# Patient Record
Sex: FEMALE | Race: WHITE | Employment: OTHER | URBAN - METROPOLITAN AREA
[De-identification: names, ages, dates, MRNs, and addresses within clinical notes are randomized per-mention and may not be internally consistent; named-entity substitution may affect disease eponyms.]

---

## 2017-02-23 ENCOUNTER — HOSPITAL PATHOLOGY (OUTPATIENT)
Dept: OTHER | Facility: CLINIC | Age: 77
End: 2017-02-23

## 2017-02-24 LAB — COPATH REPORT: NORMAL

## 2017-11-02 ENCOUNTER — TRANSFERRED RECORDS (OUTPATIENT)
Dept: HEALTH INFORMATION MANAGEMENT | Facility: CLINIC | Age: 77
End: 2017-11-02

## 2017-12-15 RX ORDER — BUTALBITAL/ASPIRIN/CAFFEINE 50-325-40
1 CAPSULE ORAL 2 TIMES DAILY PRN
Status: ON HOLD | COMMUNITY
End: 2019-11-13

## 2017-12-15 RX ORDER — ALBUTEROL SULFATE 90 UG/1
1-2 AEROSOL, METERED RESPIRATORY (INHALATION) EVERY 4 HOURS PRN
COMMUNITY

## 2017-12-15 RX ORDER — TRIAMTERENE/HYDROCHLOROTHIAZID 37.5-25 MG
1 TABLET ORAL EVERY MORNING
COMMUNITY

## 2017-12-15 RX ORDER — MULTIPLE VITAMINS W/ MINERALS TAB 9MG-400MCG
1 TAB ORAL DAILY
COMMUNITY

## 2017-12-17 ENCOUNTER — ANESTHESIA EVENT (OUTPATIENT)
Dept: SURGERY | Facility: CLINIC | Age: 77
DRG: 467 | End: 2017-12-17
Payer: MEDICARE

## 2017-12-18 ENCOUNTER — ANESTHESIA (OUTPATIENT)
Dept: SURGERY | Facility: CLINIC | Age: 77
DRG: 467 | End: 2017-12-18
Payer: MEDICARE

## 2017-12-18 ENCOUNTER — HOSPITAL ENCOUNTER (INPATIENT)
Facility: CLINIC | Age: 77
LOS: 1 days | Discharge: HOME | DRG: 467 | End: 2017-12-19
Attending: ORTHOPAEDIC SURGERY | Admitting: ORTHOPAEDIC SURGERY
Payer: MEDICARE

## 2017-12-18 DIAGNOSIS — Z96.652 HX OF TOTAL KNEE ARTHROPLASTY, LEFT: Primary | ICD-10-CM

## 2017-12-18 PROCEDURE — 40000170 ZZH STATISTIC PRE-PROCEDURE ASSESSMENT II: Performed by: ORTHOPAEDIC SURGERY

## 2017-12-18 PROCEDURE — 36000069 ZZH SURGERY LEVEL 5 EA 15 ADDTL MIN: Performed by: ORTHOPAEDIC SURGERY

## 2017-12-18 PROCEDURE — 25000128 H RX IP 250 OP 636: Performed by: ANESTHESIOLOGY

## 2017-12-18 PROCEDURE — A9270 NON-COVERED ITEM OR SERVICE: HCPCS | Mod: GY | Performed by: ORTHOPAEDIC SURGERY

## 2017-12-18 PROCEDURE — 0SPW0JZ REMOVAL OF SYNTHETIC SUBSTITUTE FROM LEFT KNEE JOINT, TIBIAL SURFACE, OPEN APPROACH: ICD-10-PCS | Performed by: ORTHOPAEDIC SURGERY

## 2017-12-18 PROCEDURE — 25000128 H RX IP 250 OP 636: Performed by: NURSE ANESTHETIST, CERTIFIED REGISTERED

## 2017-12-18 PROCEDURE — 25000125 ZZHC RX 250: Performed by: ORTHOPAEDIC SURGERY

## 2017-12-18 PROCEDURE — 36000067 ZZH SURGERY LEVEL 5 1ST 30 MIN: Performed by: ORTHOPAEDIC SURGERY

## 2017-12-18 PROCEDURE — 37000008 ZZH ANESTHESIA TECHNICAL FEE, 1ST 30 MIN: Performed by: ORTHOPAEDIC SURGERY

## 2017-12-18 PROCEDURE — 25000128 H RX IP 250 OP 636: Performed by: ORTHOPAEDIC SURGERY

## 2017-12-18 PROCEDURE — 25000128 H RX IP 250 OP 636: Performed by: PHYSICIAN ASSISTANT

## 2017-12-18 PROCEDURE — 25000566 ZZH SEVOFLURANE, EA 15 MIN: Performed by: ORTHOPAEDIC SURGERY

## 2017-12-18 PROCEDURE — C1776 JOINT DEVICE (IMPLANTABLE): HCPCS | Performed by: ORTHOPAEDIC SURGERY

## 2017-12-18 PROCEDURE — 71000012 ZZH RECOVERY PHASE 1 LEVEL 1 FIRST HR: Performed by: ORTHOPAEDIC SURGERY

## 2017-12-18 PROCEDURE — 25000125 ZZHC RX 250: Performed by: NURSE ANESTHETIST, CERTIFIED REGISTERED

## 2017-12-18 PROCEDURE — 27210995 ZZH RX 272: Performed by: ORTHOPAEDIC SURGERY

## 2017-12-18 PROCEDURE — 25000132 ZZH RX MED GY IP 250 OP 250 PS 637: Mod: GY | Performed by: ORTHOPAEDIC SURGERY

## 2017-12-18 PROCEDURE — 12000007 ZZH R&B INTERMEDIATE

## 2017-12-18 PROCEDURE — 25800025 ZZH RX 258: Performed by: ORTHOPAEDIC SURGERY

## 2017-12-18 PROCEDURE — 37000009 ZZH ANESTHESIA TECHNICAL FEE, EACH ADDTL 15 MIN: Performed by: ORTHOPAEDIC SURGERY

## 2017-12-18 PROCEDURE — 0SRW0JZ REPLACEMENT OF LEFT KNEE JOINT, TIBIAL SURFACE WITH SYNTHETIC SUBSTITUTE, OPEN APPROACH: ICD-10-PCS | Performed by: ORTHOPAEDIC SURGERY

## 2017-12-18 PROCEDURE — 25000125 ZZHC RX 250: Performed by: ANESTHESIOLOGY

## 2017-12-18 PROCEDURE — 27210794 ZZH OR GENERAL SUPPLY STERILE: Performed by: ORTHOPAEDIC SURGERY

## 2017-12-18 DEVICE — IMPLANTABLE DEVICE: Type: IMPLANTABLE DEVICE | Site: KNEE | Status: FUNCTIONAL

## 2017-12-18 RX ORDER — DEXTROSE MONOHYDRATE, SODIUM CHLORIDE, AND POTASSIUM CHLORIDE 50; 1.49; 4.5 G/1000ML; G/1000ML; G/1000ML
INJECTION, SOLUTION INTRAVENOUS CONTINUOUS
Status: DISCONTINUED | OUTPATIENT
Start: 2017-12-18 | End: 2017-12-19 | Stop reason: HOSPADM

## 2017-12-18 RX ORDER — TRAMADOL HYDROCHLORIDE 50 MG/1
50 TABLET ORAL EVERY 4 HOURS PRN
Status: DISCONTINUED | OUTPATIENT
Start: 2017-12-18 | End: 2017-12-18

## 2017-12-18 RX ORDER — ONDANSETRON 2 MG/ML
4 INJECTION INTRAMUSCULAR; INTRAVENOUS EVERY 30 MIN PRN
Status: DISCONTINUED | OUTPATIENT
Start: 2017-12-18 | End: 2017-12-18 | Stop reason: HOSPADM

## 2017-12-18 RX ORDER — PROPOFOL 10 MG/ML
INJECTION, EMULSION INTRAVENOUS PRN
Status: DISCONTINUED | OUTPATIENT
Start: 2017-12-18 | End: 2017-12-18

## 2017-12-18 RX ORDER — CELECOXIB 50 MG/1
50 CAPSULE ORAL 2 TIMES DAILY
Status: DISCONTINUED | OUTPATIENT
Start: 2017-12-18 | End: 2017-12-19 | Stop reason: HOSPADM

## 2017-12-18 RX ORDER — FENTANYL CITRATE 50 UG/ML
INJECTION, SOLUTION INTRAMUSCULAR; INTRAVENOUS PRN
Status: DISCONTINUED | OUTPATIENT
Start: 2017-12-18 | End: 2017-12-18

## 2017-12-18 RX ORDER — FENTANYL CITRATE 50 UG/ML
25-50 INJECTION, SOLUTION INTRAMUSCULAR; INTRAVENOUS
Status: DISCONTINUED | OUTPATIENT
Start: 2017-12-18 | End: 2017-12-18 | Stop reason: HOSPADM

## 2017-12-18 RX ORDER — ONDANSETRON 2 MG/ML
4 INJECTION INTRAMUSCULAR; INTRAVENOUS EVERY 6 HOURS PRN
Status: DISCONTINUED | OUTPATIENT
Start: 2017-12-18 | End: 2017-12-19 | Stop reason: HOSPADM

## 2017-12-18 RX ORDER — SODIUM CHLORIDE, SODIUM LACTATE, POTASSIUM CHLORIDE, CALCIUM CHLORIDE 600; 310; 30; 20 MG/100ML; MG/100ML; MG/100ML; MG/100ML
INJECTION, SOLUTION INTRAVENOUS CONTINUOUS
Status: DISCONTINUED | OUTPATIENT
Start: 2017-12-18 | End: 2017-12-18 | Stop reason: HOSPADM

## 2017-12-18 RX ORDER — ONDANSETRON 4 MG/1
4 TABLET, ORALLY DISINTEGRATING ORAL EVERY 30 MIN PRN
Status: DISCONTINUED | OUTPATIENT
Start: 2017-12-18 | End: 2017-12-18 | Stop reason: HOSPADM

## 2017-12-18 RX ORDER — EPHEDRINE SULFATE 50 MG/ML
INJECTION, SOLUTION INTRAMUSCULAR; INTRAVENOUS; SUBCUTANEOUS PRN
Status: DISCONTINUED | OUTPATIENT
Start: 2017-12-18 | End: 2017-12-18

## 2017-12-18 RX ORDER — LOSARTAN POTASSIUM 100 MG/1
100 TABLET ORAL AT BEDTIME
Status: DISCONTINUED | OUTPATIENT
Start: 2017-12-18 | End: 2017-12-19 | Stop reason: HOSPADM

## 2017-12-18 RX ORDER — BUPIVACAINE HYDROCHLORIDE AND EPINEPHRINE 5; 5 MG/ML; UG/ML
INJECTION, SOLUTION PERINEURAL PRN
Status: DISCONTINUED | OUTPATIENT
Start: 2017-12-18 | End: 2017-12-18 | Stop reason: HOSPADM

## 2017-12-18 RX ORDER — HYDROMORPHONE HYDROCHLORIDE 1 MG/ML
.3-.5 INJECTION, SOLUTION INTRAMUSCULAR; INTRAVENOUS; SUBCUTANEOUS
Status: DISCONTINUED | OUTPATIENT
Start: 2017-12-18 | End: 2017-12-19 | Stop reason: HOSPADM

## 2017-12-18 RX ORDER — FENTANYL CITRATE 0.05 MG/ML
25-50 INJECTION, SOLUTION INTRAMUSCULAR; INTRAVENOUS
Status: DISCONTINUED | OUTPATIENT
Start: 2017-12-18 | End: 2017-12-18 | Stop reason: HOSPADM

## 2017-12-18 RX ORDER — BUPIVACAINE HYDROCHLORIDE 7.5 MG/ML
INJECTION, SOLUTION INTRASPINAL PRN
Status: DISCONTINUED | OUTPATIENT
Start: 2017-12-18 | End: 2017-12-18

## 2017-12-18 RX ORDER — HYDROCODONE BITARTRATE AND ACETAMINOPHEN 5; 325 MG/1; MG/1
1-2 TABLET ORAL EVERY 4 HOURS PRN
Status: DISCONTINUED | OUTPATIENT
Start: 2017-12-18 | End: 2017-12-19 | Stop reason: HOSPADM

## 2017-12-18 RX ORDER — MAGNESIUM HYDROXIDE 1200 MG/15ML
LIQUID ORAL PRN
Status: DISCONTINUED | OUTPATIENT
Start: 2017-12-18 | End: 2017-12-18 | Stop reason: HOSPADM

## 2017-12-18 RX ORDER — LIDOCAINE 40 MG/G
CREAM TOPICAL
Status: DISCONTINUED | OUTPATIENT
Start: 2017-12-18 | End: 2017-12-19 | Stop reason: HOSPADM

## 2017-12-18 RX ORDER — TRIAMTERENE/HYDROCHLOROTHIAZID 37.5-25 MG
1 TABLET ORAL EVERY MORNING
Status: DISCONTINUED | OUTPATIENT
Start: 2017-12-19 | End: 2017-12-19 | Stop reason: HOSPADM

## 2017-12-18 RX ORDER — CARVEDILOL 6.25 MG/1
12.5 TABLET ORAL 2 TIMES DAILY
Status: DISCONTINUED | OUTPATIENT
Start: 2017-12-18 | End: 2017-12-19 | Stop reason: HOSPADM

## 2017-12-18 RX ORDER — CELECOXIB 50 MG/1
50 CAPSULE ORAL 2 TIMES DAILY
Status: DISCONTINUED | OUTPATIENT
Start: 2017-12-18 | End: 2017-12-18

## 2017-12-18 RX ORDER — AMOXICILLIN 250 MG
1-2 CAPSULE ORAL 2 TIMES DAILY
Status: DISCONTINUED | OUTPATIENT
Start: 2017-12-18 | End: 2017-12-19 | Stop reason: HOSPADM

## 2017-12-18 RX ORDER — TRAMADOL HYDROCHLORIDE 50 MG/1
50 TABLET ORAL EVERY 6 HOURS PRN
Status: DISCONTINUED | OUTPATIENT
Start: 2017-12-18 | End: 2017-12-19 | Stop reason: HOSPADM

## 2017-12-18 RX ORDER — NALOXONE HYDROCHLORIDE 0.4 MG/ML
.1-.4 INJECTION, SOLUTION INTRAMUSCULAR; INTRAVENOUS; SUBCUTANEOUS
Status: DISCONTINUED | OUTPATIENT
Start: 2017-12-18 | End: 2017-12-19 | Stop reason: HOSPADM

## 2017-12-18 RX ORDER — HYDROMORPHONE HYDROCHLORIDE 1 MG/ML
.3-.5 INJECTION, SOLUTION INTRAMUSCULAR; INTRAVENOUS; SUBCUTANEOUS EVERY 5 MIN PRN
Status: DISCONTINUED | OUTPATIENT
Start: 2017-12-18 | End: 2017-12-18

## 2017-12-18 RX ORDER — ONDANSETRON 4 MG/1
4 TABLET, ORALLY DISINTEGRATING ORAL EVERY 6 HOURS PRN
Status: DISCONTINUED | OUTPATIENT
Start: 2017-12-18 | End: 2017-12-19 | Stop reason: HOSPADM

## 2017-12-18 RX ORDER — CEFAZOLIN SODIUM 2 G/100ML
2 INJECTION, SOLUTION INTRAVENOUS
Status: COMPLETED | OUTPATIENT
Start: 2017-12-18 | End: 2017-12-18

## 2017-12-18 RX ORDER — NALOXONE HYDROCHLORIDE 0.4 MG/ML
.1-.4 INJECTION, SOLUTION INTRAMUSCULAR; INTRAVENOUS; SUBCUTANEOUS
Status: DISCONTINUED | OUTPATIENT
Start: 2017-12-18 | End: 2017-12-18

## 2017-12-18 RX ORDER — ACETAMINOPHEN 325 MG/1
325 TABLET ORAL EVERY 4 HOURS PRN
Status: DISCONTINUED | OUTPATIENT
Start: 2017-12-18 | End: 2017-12-19 | Stop reason: HOSPADM

## 2017-12-18 RX ORDER — PROPOFOL 10 MG/ML
INJECTION, EMULSION INTRAVENOUS CONTINUOUS PRN
Status: DISCONTINUED | OUTPATIENT
Start: 2017-12-18 | End: 2017-12-18

## 2017-12-18 RX ORDER — DEXAMETHASONE SODIUM PHOSPHATE 4 MG/ML
INJECTION, SOLUTION INTRA-ARTICULAR; INTRALESIONAL; INTRAMUSCULAR; INTRAVENOUS; SOFT TISSUE PRN
Status: DISCONTINUED | OUTPATIENT
Start: 2017-12-18 | End: 2017-12-18

## 2017-12-18 RX ORDER — LIDOCAINE HYDROCHLORIDE 20 MG/ML
INJECTION, SOLUTION INFILTRATION; PERINEURAL PRN
Status: DISCONTINUED | OUTPATIENT
Start: 2017-12-18 | End: 2017-12-18

## 2017-12-18 RX ADMIN — Medication 10 MG: at 16:34

## 2017-12-18 RX ADMIN — DEXMEDETOMIDINE HYDROCHLORIDE 8 MCG: 100 INJECTION, SOLUTION INTRAVENOUS at 15:48

## 2017-12-18 RX ADMIN — HYDROCODONE BITARTRATE AND ACETAMINOPHEN 1 TABLET: 5; 325 TABLET ORAL at 20:46

## 2017-12-18 RX ADMIN — LIDOCAINE HYDROCHLORIDE 1 ML: 10 INJECTION, SOLUTION EPIDURAL; INFILTRATION; INTRACAUDAL; PERINEURAL at 13:27

## 2017-12-18 RX ADMIN — SUCCINYLCHOLINE CHLORIDE 20 MG: 20 INJECTION, SOLUTION INTRAMUSCULAR; INTRAVENOUS at 16:03

## 2017-12-18 RX ADMIN — LOSARTAN POTASSIUM 100 MG: 100 TABLET, FILM COATED ORAL at 22:38

## 2017-12-18 RX ADMIN — CARVEDILOL 12.5 MG: 6.25 TABLET, FILM COATED ORAL at 20:47

## 2017-12-18 RX ADMIN — SODIUM CHLORIDE, POTASSIUM CHLORIDE, SODIUM LACTATE AND CALCIUM CHLORIDE: 600; 310; 30; 20 INJECTION, SOLUTION INTRAVENOUS at 16:36

## 2017-12-18 RX ADMIN — EPINEPHRINE 20 ML GIVEN: 1 INJECTION INTRAMUSCULAR; INTRAVENOUS; SUBCUTANEOUS at 13:55

## 2017-12-18 RX ADMIN — BUPIVACAINE HYDROCHLORIDE IN DEXTROSE 9 MG: 7.5 INJECTION, SOLUTION SUBARACHNOID at 15:52

## 2017-12-18 RX ADMIN — PROPOFOL 150 MG: 10 INJECTION, EMULSION INTRAVENOUS at 16:00

## 2017-12-18 RX ADMIN — FENTANYL CITRATE 50 MCG: 50 INJECTION, SOLUTION INTRAMUSCULAR; INTRAVENOUS at 16:09

## 2017-12-18 RX ADMIN — DEXMEDETOMIDINE HYDROCHLORIDE 12 MCG: 100 INJECTION, SOLUTION INTRAVENOUS at 16:12

## 2017-12-18 RX ADMIN — CEFAZOLIN SODIUM 1 G: 1 SOLUTION INTRAVENOUS at 22:40

## 2017-12-18 RX ADMIN — FENTANYL CITRATE 50 MCG: 50 INJECTION, SOLUTION INTRAMUSCULAR; INTRAVENOUS at 16:12

## 2017-12-18 RX ADMIN — LIDOCAINE HYDROCHLORIDE 40 MG: 20 INJECTION, SOLUTION INFILTRATION; PERINEURAL at 15:56

## 2017-12-18 RX ADMIN — MIDAZOLAM HYDROCHLORIDE 1 MG: 1 INJECTION, SOLUTION INTRAMUSCULAR; INTRAVENOUS at 14:02

## 2017-12-18 RX ADMIN — FENTANYL CITRATE 50 MCG: 50 INJECTION, SOLUTION INTRAMUSCULAR; INTRAVENOUS at 14:02

## 2017-12-18 RX ADMIN — SODIUM CHLORIDE, POTASSIUM CHLORIDE, SODIUM LACTATE AND CALCIUM CHLORIDE: 600; 310; 30; 20 INJECTION, SOLUTION INTRAVENOUS at 13:27

## 2017-12-18 RX ADMIN — Medication 10 MG: at 16:15

## 2017-12-18 RX ADMIN — POTASSIUM CHLORIDE, DEXTROSE MONOHYDRATE AND SODIUM CHLORIDE: 150; 5; 450 INJECTION, SOLUTION INTRAVENOUS at 19:26

## 2017-12-18 RX ADMIN — DEXAMETHASONE SODIUM PHOSPHATE 4 MG: 4 INJECTION, SOLUTION INTRA-ARTICULAR; INTRALESIONAL; INTRAMUSCULAR; INTRAVENOUS; SOFT TISSUE at 16:16

## 2017-12-18 RX ADMIN — PROPOFOL 100 MCG/KG/MIN: 10 INJECTION, EMULSION INTRAVENOUS at 16:04

## 2017-12-18 RX ADMIN — HYDROCODONE BITARTRATE AND ACETAMINOPHEN 1 TABLET: 5; 325 TABLET ORAL at 19:49

## 2017-12-18 RX ADMIN — PROPOFOL 50 MG: 10 INJECTION, EMULSION INTRAVENOUS at 16:12

## 2017-12-18 RX ADMIN — CEFAZOLIN SODIUM 2 G: 2 INJECTION, SOLUTION INTRAVENOUS at 15:55

## 2017-12-18 ASSESSMENT — ENCOUNTER SYMPTOMS: DYSRHYTHMIAS: 1

## 2017-12-18 NOTE — IP AVS SNAPSHOT
MRN:[id number]                      After Visit Summary   12/18/2017    Suzi Amaya    MRN: [id number]           Thank you!     Thank you for choosing [facility] for your care. Our goal is always to provide you with excellent care. Hearing back from our patients is one way we can continue to improve our services. Please take a few minutes to complete the written survey that you may receive in the mail after you visit with us. Thank you!        Patient Information     Date Of Birth          1940        Designated Caregiver       Most Recent Value    Caregiver    Will someone help with your care after discharge? yes    Name of designated caregiver Jose Alejandro-    Phone number of caregiver [phone]    Caregiver address [address] [place] [address]      About your hospital stay     You were admitted on:  December 18, 2017 You last received care in the:  [facility] [address] [facility]    You were discharged on:  December 19, 2017        Reason for your hospital stay       S/P Poly Exchange, Left knee                  Who to Call     For medical emergencies, please call 911.  For non-urgent questions about your medical care, please call your primary care provider or clinic, None  For questions related to your surgery, please call your surgery clinic        Attending Provider     Provider Blu Crump MD Orthopedics       Primary Care Provider Fax #    Physician No Ref-Primary [phone]      After Care Instructions     Activity       Your activity upon discharge: activity as tolerated            Diet       Follow this diet upon discharge: Orders Placed This Encounter      Advance Diet as Tolerated: Regular Diet Adult            Wound care and dressings       Instructions to care for your wound at home: daily dressing changes.                  Follow-up Appointments     Follow-up and recommended labs and tests        Follow up with Dr Mitchell in two weeks. To  "make an appointment call Susan at [phone]                  Your next 10 appointments already scheduled     Dec 21, 2017  2:40 PM CST   (Arrive by 2:25 PM)   LUIS Extremity with Jesusita Bethea PT   [facility] ([facility]  )    [address] [facility] [address] [phone]              Pending Results     No orders found for last 3 day(s).            Statement of Approval     Ordered          17 0911  I have reviewed and agree with all the recommendations and orders detailed in this document.  EFFECTIVE NOW     Approved and electronically signed by:  Yadi Cruz PA-C             Admission Information     Date & Time Provider Department Dept. Phone    2017 Blu Mitchell MD [facility] [address] [facility] [phone]      Your Vitals Were     Blood Pressure Pulse Temperature Respirations Height Weight    104/63 (BP Location: Left arm) 62 97.7  F (36.5  C) (Oral) 16 1.575 m (5' 2\") 66.5 kg (146 lb 11.2 oz)    Pulse Oximetry BMI (Body Mass Index)                95% 26.83 kg/m2          MyChart Information     [facility] lets you send messages to your doctor, view your test results, renew your prescriptions, schedule appointments and more. To sign up, go to [email or web address] . Click on \"Log in\" on the left side of the screen, which will take you to the Welcome page. Then click on \"Sign up Now\" on the right side of the page.     You will be asked to enter the access code listed below, as well as some personal information. Please follow the directions to create your username and password.     Your access code is: [email or web address]  Expires: 3/19/2018  9:53 AM     Your access code will  in 90 days. If you need help or a new code, please call your [facility] clinic or [phone].        Care EveryWhere ID     This is your Care EveryWhere ID. This could be used by other organizations to access your [facility] " medical records  [id number]        Equal Access to Services     RACHELLE BELLAMY : Hadii aad ku hadguillerminadanni Mares, waivetda kalyan, qaconsuelota prakashmamik tabor, rogerio serrano. So [facility] [phone].    ATENCIÓN: Si habla español, tiene a king disposición servicios gratuitos de asistencia lingüística. Llame al [phone].    We comply with applicable federal civil rights laws and [place] laws. We do not discriminate on the basis of race, color, national origin, age, disability, sex, sexual orientation, or gender identity.               Review of your medicines      START taking        Dose / Directions    acetaminophen 325 MG tablet   Commonly known as:  TYLENOL   Notes to Patient:  Do not take tylenol while on Norco        Dose:  325 mg   Take 1 tablet (325 mg) by mouth every 4 hours as needed for mild pain or fever   Quantity:  100 tablet   Refills:  0       HYDROcodone-acetaminophen 5-325 MG per tablet   Commonly known as:  NORCO        Dose:  1-2 tablet   Take 1-2 tablets by mouth every 4 hours as needed for moderate to severe pain   Quantity:  40 tablet   Refills:  0       ondansetron 4 MG ODT tab   Commonly known as:  ZOFRAN-ODT        Dose:  4 mg   Take 1 tablet (4 mg) by mouth every 6 hours as needed for nausea or vomiting   Quantity:  20 tablet   Refills:  0       senna-docusate 8.6-50 MG per tablet   Commonly known as:  SENOKOT-S;PERICOLACE   Notes to Patient:  Continue while on narcotics; stop taking if having loose stools; you can take 1 tablet twice a day once having a bowel movement        Dose:  1-2 tablet   Take 1-2 tablets by mouth 2 times daily   Quantity:  50 tablet   Refills:  0       traMADol 50 MG tablet   Commonly known as:  ULTRAM        Dose:  50 mg   Take 1 tablet (50 mg) by mouth every 6 hours as needed for moderate pain   Quantity:  20 tablet   Refills:  0         CONTINUE these medicines which have NOT CHANGED        Dose / Directions    albuterol 108 (90 BASE)  MCG/ACT Inhaler   Commonly known as:  PROAIR HFA/PROVENTIL HFA/VENTOLIN HFA        Dose:  1-2 puff   Inhale 1-2 puffs into the lungs every 4 hours as needed for shortness of breath / dyspnea or wheezing   Refills:  0       ALLERGY INJECTIONS        Dose:  1 Dose   Inject 1 Dose as directed every 30 days Given at [facility]   Refills:  0       B-12 PO        Dose:  1 tablet   Take 1 tablet by mouth daily   Refills:  0       butalbital-aspirin-caffeine -40 MG per capsule   Commonly known as:  FIORINAL        Dose:  1 capsule   Take 1 capsule by mouth 2 times daily as needed for headaches   Refills:  0       CARVEDILOL PO        Dose:  12.5 mg   Take 12.5 mg by mouth 2 times daily   Refills:  0       CELEBREX PO        Dose:  100 mg   Take 100 mg by mouth daily as needed for moderate pain   Refills:  0       ELIQUIS PO   Notes to Patient:  Start tomorrow night (12/20) night per surgeon's verbal recommendation        Dose:  5 mg   Take 5 mg by mouth 2 times daily   Refills:  0       LOSARTAN POTASSIUM PO        Dose:  100 mg   Take 100 mg by mouth At Bedtime   Refills:  0       multivitamin, therapeutic with minerals Tabs tablet        Dose:  1 tablet   Take 1 tablet by mouth daily   Refills:  0       OMEPRAZOLE PO        Dose:  40 mg   Take 40 mg by mouth daily (with dinner)   Refills:  0       triamterene-hydrochlorothiazide 37.5-25 MG per tablet   Commonly known as:  MAXZIDE-25        Dose:  1 tablet   Take 1 tablet by mouth every morning   Refills:  0       VITAMIN D (CHOLECALCIFEROL) PO        Dose:  1 tablet   Take 1 tablet by mouth daily (with dinner)   Refills:  0            Where to get your medicines      These medications were sent to [facility] - [address]  [address], [place] [address]     Phone:  [phone]     acetaminophen 325 MG tablet    ondansetron 4 MG ODT tab    senna-docusate 8.6-50 MG per tablet         Some of these will need a  paper prescription and others can be bought over the counter. Ask your nurse if you have questions.     Bring a paper prescription for each of these medications     HYDROcodone-acetaminophen 5-325 MG per tablet    traMADol 50 MG tablet                Protect others around you: Learn how to safely use, store and throw away your medicines at www.disposemymeds.org.             Medication List: This is a list of all your medications and when to take them. Check marks below indicate your daily home schedule. Keep this list as a reference.      Medications           Morning Afternoon Evening Bedtime As Needed    acetaminophen 325 MG tablet   Commonly known as:  TYLENOL   Take 1 tablet (325 mg) by mouth every 4 hours as needed for mild pain or fever   Notes to Patient:  Do not take tylenol while on Norco                                   albuterol 108 (90 BASE) MCG/ACT Inhaler   Commonly known as:  PROAIR HFA/PROVENTIL HFA/VENTOLIN HFA   Inhale 1-2 puffs into the lungs every 4 hours as needed for shortness of breath / dyspnea or wheezing   Next Dose Due:  Resume home meds                                ALLERGY INJECTIONS   Inject 1 Dose as directed every 30 days Given at [facility]   Next Dose Due:  Resume home meds                                B-12 PO   Take 1 tablet by mouth daily   Next Dose Due:  Resume home meds                                butalbital-aspirin-caffeine -40 MG per capsule   Commonly known as:  FIORINAL   Take 1 capsule by mouth 2 times daily as needed for headaches   Next Dose Due:  Resume home meds                                CARVEDILOL PO   Take 12.5 mg by mouth 2 times daily   Last time this was given:  12.5 mg on 12/19/2017  8:09 AM   Next Dose Due:  Tonight                                       CELEBREX PO   Take 100 mg by mouth daily as needed for moderate pain   Last time this was given:  50 mg on 12/19/2017  8:09 AM   Next Dose Due:  12/20/17                                    ELIQUIS PO   Take 5 mg by mouth 2 times daily   Next Dose Due:  Resume home meds   Notes to Patient:  Start tomorrow night (12/20) night per surgeon's verbal recommendation                                HYDROcodone-acetaminophen 5-325 MG per tablet   Commonly known as:  NORCO   Take 1-2 tablets by mouth every 4 hours as needed for moderate to severe pain   Last time this was given:  2 tablets on 12/19/2017  8:09 AM   Next Dose Due:  12:10pm                                   LOSARTAN POTASSIUM PO   Take 100 mg by mouth At Bedtime   Last time this was given:  100 mg on 12/18/2017 10:38 PM   Next Dose Due:  Tonight                                    multivitamin, therapeutic with minerals Tabs tablet   Take 1 tablet by mouth daily   Next Dose Due:  Resume home meds                                OMEPRAZOLE PO   Take 40 mg by mouth daily (with dinner)   Next Dose Due:  Resume home meds                                ondansetron 4 MG ODT tab   Commonly known as:  ZOFRAN-ODT   Take 1 tablet (4 mg) by mouth every 6 hours as needed for nausea or vomiting                                senna-docusate 8.6-50 MG per tablet   Commonly known as:  SENOKOT-S;PERICOLACE   Take 1-2 tablets by mouth 2 times daily   Last time this was given:  2 tablets on 12/19/2017  8:10 AM   Next Dose Due:  Tonight    Notes to Patient:  Continue while on narcotics; stop taking if having loose stools; you can take 1 tablet twice a day once having a bowel movement                                traMADol 50 MG tablet   Commonly known as:  ULTRAM   Take 1 tablet (50 mg) by mouth every 6 hours as needed for moderate pain   Next Dose Due:  At anytime; try to alternate with Norco.                                   triamterene-hydrochlorothiazide 37.5-25 MG per tablet   Commonly known as:  MAXZIDE-25   Take 1 tablet by mouth every morning   Last time this was given:  1 tablet on 12/19/2017  8:09 AM   Next Dose Due:  12/20/17                                    VITAMIN D (CHOLECALCIFEROL) PO   Take 1 tablet by mouth daily (with dinner)   Next Dose Due:  Resume home meds

## 2017-12-18 NOTE — IP AVS SNAPSHOT
[address] [facility]    [address] [place] [address]    Phone:  [phone]                                       After Visit Summary   12/18/2017    Suzi Amaya    MRN: [id number]           After Visit Summary Signature Page     I have received my discharge instructions, and my questions have been answered. I have discussed any challenges I see with this plan with the nurse or doctor.    ..........................................................................................................................................  Patient/Patient Representative Signature      ..........................................................................................................................................  Patient Representative Print Name and Relationship to Patient    ..................................................               ................................................  Date                                            Time    ..........................................................................................................................................  Reviewed by Signature/Title    ...................................................              ..............................................  Date                                                            Time

## 2017-12-18 NOTE — ANESTHESIA PROCEDURE NOTES
Peripheral nerve/Neuraxial procedure note : intrathecal  Pre-Procedure  Performed by ELAINE NEWSOME  Location: OR      Pre-Anesthestic Checklist: patient identified, IV checked, risks and benefits discussed, informed consent, monitors and equipment checked and pre-op evaluation    Timeout  Correct Patient: Yes   Correct Procedure: Yes   Correct Site: Yes   Correct Laterality: N/A   Correct Position: Yes   Site Marked: N/A   .   Procedure Documentation    .    Procedure:    Intrathecal.  Insertion Site:L3-4  (midline approach)      Patient Prep;povidone-iodine 7.5% surgical scrub.  .  Needle: Nisha tip Spinal Needle (gauge): 25  Spinal/LP Needle Length (inches): 3.5 # of attempts: 1 and # of redirects:  .       Assessment/Narrative  Paresthesias: No.  .  .  clear CSF fluid removed . Comments:  Subarachnoid Block  1.2 ml 0.75% bupivacaine with dextrose

## 2017-12-18 NOTE — PROGRESS NOTES
Admission medication history interview status for the 12/18/2017  admission is complete. See EPIC admission navigator for prior to admission medications     Medication history source reliability:Good    Medication history interview source(s):Patient    Medication history resources (including written lists, pill bottles, clinic record):Patient brought a list with her    Primary pharmacy[facility]    Additional medication history information not noted on PTA med list :None    Time spent in this activity: 45 minutes    Prior to Admission medications    Medication Sig Last Dose Taking? Auth Provider   VITAMIN D, CHOLECALCIFEROL, PO Take 1 tablet by mouth daily (with dinner) 12/17/2017 at PM Yes Reported, Patient   Cyanocobalamin (B-12 PO) Take 1 tablet by mouth daily 12/17/2017 at AM Yes Reported, Patient   albuterol (PROAIR HFA/PROVENTIL HFA/VENTOLIN HFA) 108 (90 BASE) MCG/ACT Inhaler Inhale 1-2 puffs into the lungs every 4 hours as needed for shortness of breath / dyspnea or wheezing More than a month at PRN Yes Reported, Patient   butalbital-aspirin-caffeine (FIORINAL) -40 MG per capsule Take 1 capsule by mouth 2 times daily as needed for headaches  One Month Ago at PRN Yes Reported, Patient   CARVEDILOL PO Take 12.5 mg by mouth 2 times daily 12/18/2017 at 0800 Yes Reported, Patient   Celecoxib (CELEBREX PO) Take 100 mg by mouth daily as needed for moderate pain  More than a Month at PRN Yes Reported, Patient   Apixaban (ELIQUIS PO) Take 5 mg by mouth 2 times daily 12/14/2017 at AM Yes Reported, Patient   LOSARTAN POTASSIUM PO Take 100 mg by mouth At Bedtime 12/17/2017 at HS Yes Reported, Patient   multivitamin, therapeutic with minerals (THERA-VIT-M) TABS tablet Take 1 tablet by mouth daily 12/17/2017 at AM Yes Reported, Patient   OMEPRAZOLE PO Take 40 mg by mouth daily (with dinner)  12/17/2017 at PM Yes Reported, Patient   triamterene-hydrochlorothiazide (MAXZIDE-25) 37.5-25 MG per tablet Take 1 tablet by mouth  every morning 12/18/2017 at 0800 Yes Reported, Patient   ALLERGY INJECTIONS Inject 1 Dose as directed every 30 days Given at [facility] November 2017 Yes Reported, Patient

## 2017-12-18 NOTE — ANESTHESIA PREPROCEDURE EVALUATION
Anesthesia Evaluation     . Pt has had prior anesthetic.     No history of anesthetic complications          ROS/MED HX    ENT/Pulmonary:     (+)Intermittent asthma Treatment: Inhaler prn,  , . .   (-) sleep apnea   Neurologic:       Cardiovascular:     (+) hypertension----. : . CHF . . :. dysrhythmias (paroxysmal) a-fib, Irregular Heartbeat/Palpitations, . Previous cardiac testing Echodate:2016results:EF 50-55%date: results: date: results: date: results:          METS/Exercise Tolerance:     Hematologic:         Musculoskeletal:         GI/Hepatic:     (+) GERD Asymptomatic on medication,       Renal/Genitourinary:         Endo:         Psychiatric:         Infectious Disease:         Malignancy:         Other:                     Physical Exam  Normal systems: cardiovascular and pulmonary    Airway   Mallampati: II  TM distance: >3 FB  Neck ROM: full    Dental   Comment: native    Cardiovascular       Pulmonary                     Anesthesia Plan      History & Physical Review  History and physical reviewed and following examination; no interval change.    ASA Status:  2 .    NPO Status:  > 8 hours    Plan for Spinal and Periph. Nerve Block for postop pain   PONV prophylaxis:  Ondansetron (or other 5HT-3)       Postoperative Care  Postoperative pain management:  Peripheral nerve block (Single Shot).      Consents  Anesthetic plan, risks, benefits and alternatives discussed with:  Patient..                          .

## 2017-12-18 NOTE — ANESTHESIA PROCEDURE NOTES
Peripheral nerve/Neuraxial procedure note : femoral and Adductor canal  Pre-Procedure  Performed by ELAINE NEWSOME  Location: pre-op      Pre-Anesthestic Checklist: patient identified, site marked, risks and benefits discussed, informed consent, pre-op evaluation, at physician/surgeon's request and post-op pain management    Timeout  Correct Patient: Yes   Correct Procedure: Yes   Correct Site: Yes   Correct Laterality: Yes   Correct Position: Yes   Site Marked: Yes   .   Procedure Documentation    .    Procedure:    Femoral and Adductor canal.  Local skin infiltrated with mL of 1% lidocaine.     Ultrasound used to identify targeted nerve, plexus, or vascular marker and placed a needle adjacent to it., Ultrasound was used to visualize the spread of the anesthetic in close proximity to the above stated nerve. A permanent image is entered into the patient's record.  Patient Prep;chlorhexidine gluconate and isopropyl alcohol.  .  Needle: insulated, short bevel Needle Gauge: 22.  .       Assessment/Narrative  Paresthesias: No.  Injection made incrementally with aspirations every 5 mL..  The placement was negative for: blood aspirated, painful injection and site bleeding.  Bolus given via..   Secured via.   Complications: none. Comments:  Femoral nerve identified lateral to femoral artery; under continuous US guidance, needle advanced.  20cc 0.5% bupivicaine with epi 1:400,000 injected  The surgeon has given a verbal order transferring care of this patient to me for the performance of a regional analgesia block for post-op pain control. It is requested of me because I am uniquely trained and qualified to perform this block and the surgeon is neither trained nor qualified to perform this procedure.

## 2017-12-18 NOTE — ANESTHESIA CARE TRANSFER NOTE
Patient: Suzi Amaya    Procedure(s):  LEFT TOTAL KNEE POLYETHYLENE EXCHANGE    - Wound Class: I-Clean    Diagnosis: DJD  Diagnosis Additional Information: No value filed.    Anesthesia Type:   Spinal, Periph. Nerve Block for postop pain     Note:  Airway :Nasal Cannula  Patient transferred to:PACU        Vitals: (Last set prior to Anesthesia Care Transfer)    CRNA VITALS  12/18/2017 1623 - 12/18/2017 1656      12/18/2017             Pulse: 76    SpO2: 95 %    Resp Rate (observed): (!)  2    Resp Rate (set): 10                Electronically Signed By: SERGIO Rosas CRNA  December 18, 2017  4:56 PM

## 2017-12-19 ENCOUNTER — APPOINTMENT (OUTPATIENT)
Dept: PHYSICAL THERAPY | Facility: CLINIC | Age: 77
DRG: 467 | End: 2017-12-19
Attending: ORTHOPAEDIC SURGERY
Payer: MEDICARE

## 2017-12-19 VITALS
BODY MASS INDEX: 27 KG/M2 | DIASTOLIC BLOOD PRESSURE: 63 MMHG | TEMPERATURE: 97.7 F | HEIGHT: 62 IN | RESPIRATION RATE: 16 BRPM | WEIGHT: 146.7 LBS | OXYGEN SATURATION: 95 % | HEART RATE: 62 BPM | SYSTOLIC BLOOD PRESSURE: 104 MMHG

## 2017-12-19 LAB — GLUCOSE BLDC GLUCOMTR-MCNC: 143 MG/DL (ref 70–99)

## 2017-12-19 PROCEDURE — 25000128 H RX IP 250 OP 636: Performed by: ORTHOPAEDIC SURGERY

## 2017-12-19 PROCEDURE — 40000193 ZZH STATISTIC PT WARD VISIT: Performed by: PHYSICAL THERAPIST

## 2017-12-19 PROCEDURE — 25000132 ZZH RX MED GY IP 250 OP 250 PS 637: Mod: GY | Performed by: ORTHOPAEDIC SURGERY

## 2017-12-19 PROCEDURE — A9270 NON-COVERED ITEM OR SERVICE: HCPCS | Mod: GY | Performed by: ORTHOPAEDIC SURGERY

## 2017-12-19 PROCEDURE — 00000146 ZZHCL STATISTIC GLUCOSE BY METER IP

## 2017-12-19 PROCEDURE — 97161 PT EVAL LOW COMPLEX 20 MIN: CPT | Mod: GP | Performed by: PHYSICAL THERAPIST

## 2017-12-19 RX ORDER — TRAMADOL HYDROCHLORIDE 50 MG/1
50 TABLET ORAL EVERY 6 HOURS PRN
Qty: 20 TABLET | Refills: 0 | Status: SHIPPED | OUTPATIENT
Start: 2017-12-19 | End: 2018-05-23

## 2017-12-19 RX ORDER — ACETAMINOPHEN 325 MG/1
325 TABLET ORAL EVERY 4 HOURS PRN
Qty: 100 TABLET | Refills: 0 | Status: SHIPPED | OUTPATIENT
Start: 2017-12-19

## 2017-12-19 RX ORDER — HYDROCODONE BITARTRATE AND ACETAMINOPHEN 5; 325 MG/1; MG/1
1-2 TABLET ORAL EVERY 4 HOURS PRN
Qty: 40 TABLET | Refills: 0 | Status: SHIPPED | OUTPATIENT
Start: 2017-12-19 | End: 2018-05-23

## 2017-12-19 RX ORDER — AMOXICILLIN 250 MG
1-2 CAPSULE ORAL 2 TIMES DAILY
Qty: 50 TABLET | Refills: 0 | Status: SHIPPED | OUTPATIENT
Start: 2017-12-19 | End: 2019-11-12

## 2017-12-19 RX ORDER — ONDANSETRON 4 MG/1
4 TABLET, ORALLY DISINTEGRATING ORAL EVERY 6 HOURS PRN
Qty: 20 TABLET | Refills: 0 | Status: SHIPPED | OUTPATIENT
Start: 2017-12-19

## 2017-12-19 RX ADMIN — SENNOSIDES AND DOCUSATE SODIUM 2 TABLET: 8.6; 5 TABLET ORAL at 08:10

## 2017-12-19 RX ADMIN — HYDROCODONE BITARTRATE AND ACETAMINOPHEN 2 TABLET: 5; 325 TABLET ORAL at 03:43

## 2017-12-19 RX ADMIN — TRIAMTERENE AND HYDROCHLOROTHIAZIDE 1 TABLET: 37.5; 25 TABLET ORAL at 08:09

## 2017-12-19 RX ADMIN — HYDROCODONE BITARTRATE AND ACETAMINOPHEN 2 TABLET: 5; 325 TABLET ORAL at 08:09

## 2017-12-19 RX ADMIN — CELECOXIB 50 MG: 50 CAPSULE ORAL at 08:09

## 2017-12-19 RX ADMIN — CEFAZOLIN SODIUM 1 G: 1 SOLUTION INTRAVENOUS at 06:38

## 2017-12-19 RX ADMIN — CARVEDILOL 12.5 MG: 6.25 TABLET, FILM COATED ORAL at 08:09

## 2017-12-19 ASSESSMENT — PAIN DESCRIPTION - DESCRIPTORS: DESCRIPTORS: ACHING

## 2017-12-19 NOTE — PROGRESS NOTES
Patient discharge to home with  at 1215pm. A/Ox4. New dressing applied to L knee. Staples intact with scant sanguinous drainage. CMS intact. Pain well managed with prn norco. Afebrile. vss on RA. SBA for mobility with walker and cane. Discharge to home with . Discharge instruction reviewed with patient and . All question answered. Discharge instruction, scripts for norco and ultram sent with . All belongings packed and sent. Voiding. Passing flatus. Denies nausea. Tolerating regular diet well.

## 2017-12-19 NOTE — OP NOTE
PROCEDURE DATE:  12/18/2017      PREOPERATIVE DIAGNOSIS:  Failed tibial component, left total knee arthroplasty.      POSTOPERATIVE DIAGNOSIS:  Failed tibial component, left total knee arthroplasty.      PROCEDURE:  Revision, left tibial component knee arthroplasty.      HISTORY OF PRESENT ILLNESS:  Suzi Amaya is a 77-year-old female who had undergone a previous left knee arthroplasty almost 12 years ago.  She has functional instability now with normal activities after falling walking her dog some months ago.  She is limited in her ability and has marked flexion, extension instability, particularly with increasing varus.  She has had CT scan showing that the femur and tibia were not loosened, considered a candidate for polyethylene exchange isolated.  Benefits and risks were discussed, consent for the procedure obtained.      OPERATION:  The patient was taken to the operating room, general anesthesia administered, documentation of flexion instability noted, and routine arthrotomy performed after prepping and draping, elevation, exsanguination and tourniquet inflated.  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  There was no metal debris within the joint.  The tibial component was removed and had obvious loosening over the far medial aspect of the component.  Complete debridement was done, particularly of the patellofemoral mechanism, and a trial done with a 17 poly spacer, which afforded no flexion extension gap and normal tracking.  At this point then, a 17 poly was revised for the knee arthroplasty, which afforded again good stability and normal tracking.  At this point, after documentation of tracking, closure was done with interrupted #1 Vicryl suture in the deep fascia, 2-0 Vicryl in subcutaneous tissue, and staples within the skin.  Placed in a routine compressive dressing, leaving the operating facility in satisfactory  condition without apparent unusual complication.      TOURNIQUET TIME FOR THE PROCEDURE:  Forty minutes.         IRON BERG MD             D: 2017 09:15   T: 2017 13:58   MT:       Name:     OMID PAZ   MRN:      [id number]        Account:        [id number]   :      1940           Procedure Date: 2017      Document: [id number]

## 2017-12-19 NOTE — PLAN OF CARE
Problem: Patient Care Overview  Goal: Plan of Care/Patient Progress Review  Outcome: No Change  9345-2547 POD 1 LKA revision. Patient A/O x4. 2L NC other VSS. Pain managed with [place]. Patient denies SOB, nausea, numbness/tingling. CMS intact. Tolerating regular diet. Up 1/SBA with gait belt and walker. IS 2500. Dressing CDI. IVF. Will continue to monitor

## 2017-12-19 NOTE — PLAN OF CARE
Problem: Knee Arthroplasty (Total, Partial) (Adult)  Goal: Signs and Symptoms of Listed Potential Problems Will be Absent, Minimized or Managed (Knee Arthroplasty)  Signs and symptoms of listed potential problems will be absent, minimized or managed by discharge/transition of care (reference Knee Arthroplasty (Total, Partial) (Adult) CPG).   Outcome: Improving  Patient up with assist of 1 and walker.  Pain managed with [place].  VSS on 2L NC.  A/Ox4.  Dressing CDI.  CMS intact.  Voiding adequately.  Good PO intake.  Plan to D/C home tomorrow.

## 2017-12-19 NOTE — ANESTHESIA POSTPROCEDURE EVALUATION
Patient: Suzi Amaya    Procedure(s):  LEFT TOTAL KNEE POLYETHYLENE EXCHANGE    - Wound Class: I-Clean    Diagnosis:DJD  Diagnosis Additional Information: No value filed.    Anesthesia Type:  Spinal, Periph. Nerve Block for postop pain    Note:  Anesthesia Post Evaluation    Patient location during evaluation: PACU  Patient participation: Able to fully participate in evaluation  Level of consciousness: awake  Pain management: adequate  Airway patency: patent  Cardiovascular status: acceptable  Respiratory status: acceptable  Hydration status: acceptable  PONV: controlled     Anesthetic complications: None          Last vitals:  Vitals:    12/18/17 1750 12/18/17 1800 12/18/17 1816   BP: 137/73  157/81   Pulse:   69   Resp: 20 14 13   Temp: 36.5  C (97.7  F) 36.6  C (97.9  F) 36.5  C (97.7  F)   SpO2: 98% 98% 96%         Electronically Signed By: Taylor Cabrera MD  December 18, 2017  6:47 PM

## 2017-12-19 NOTE — PLAN OF CARE
Problem: Patient Care Overview  Goal: Plan of Care/Patient Progress Review  OT: Pt discharged prior to OT eval.

## 2017-12-20 NOTE — PROGRESS NOTES
12/19/17 1113   Quick Adds   Type of Visit Initial PT Evaluation   Living Environment   Lives With spouse   Living Arrangements house  (2 story with LL)   Home Accessibility stairs to enter home;stairs within home   Number of Stairs to Enter Home 2   Number of Stairs Within Home (Full flights up and down)   Stair Railings at Home inside, present at both sides;inside, present on right side   Transportation Available car;family or friend will provide   Self-Care   Dominant Hand left   Usual Activity Tolerance good   Current Activity Tolerance poor   Regular Exercise yes   Activity/Exercise Type walking   Exercise Amount/Frequency greater than 1 hr;daily   Equipment Currently Used at Home none   Functional Level Prior   Ambulation 0-->independent   Transferring 0-->independent   Toileting 0-->independent   Bathing 0-->independent   Dressing 0-->independent   Eating 0-->independent   Communication 0-->understands/communicates without difficulty   Swallowing 0-->swallows foods/liquids without difficulty   Cognition 0 - no cognition issues reported   Fall history within last six months yes   Number of times patient has fallen within last six months 1   Which of the above functional risks had a recent onset or change? ambulation   General Information   Onset of Illness/Injury or Date of Surgery - Date 12/18/17   Referring Physician Blu Mitchell   Patient/Family Goals Statement Disch to home with help of her .   Pertinent History of Current Problem (include personal factors and/or comorbidities that impact the POC) Progressive pain and immobility in L knee, s/p TKA in 2005.   Precautions/Limitations fall precautions   Weight-Bearing Status - LLE weight-bearing as tolerated   Cognitive Status Examination   Orientation orientation to person, place and time   Level of Consciousness alert   Follows Commands and Answers Questions 100% of the time;able to follow multistep instructions   Personal Safety and Judgment intact    Memory intact   Pain Assessment   Patient Currently in Pain Yes, see Vital Sign flowsheet  (2/10 L knee.)   Integumentary/Edema   Integumentary/Edema Comments Surgical site covered by postop dressing.   Posture    Posture Forward head position   Posture Comments Tends to slouch with sitting and standing.   Range of Motion (ROM)   ROM Comment L knee ROM: 5-112 degrees.   Strength   Strength Comments L L/E strength mildly inhibited by postop pain  and edema.   Bed Mobility   Bed Mobility Bed mobility analysis   Bed Mobility Comments Given SBA, but essentially independent supine to sit and back to supine.   Bed Mobility Analysis   Impairments Contributing to Impaired Bed Mobility pain;decreased flexibility   Transfer Skills   Transfer Comments Patient given SBA, but essentially independent in transfers, sit to stand and back to sit, without AD.   Gait   Gait Gait Analysis   Gait Comments Patient given SBA, but essentially independent in gait without AD, showing only very slight limp on L.   Gait Analysis   Gait Pattern Used swing-through gait   Gait Deviations Noted decreased kalyn;decreased step length   Balance   Balance no deficits were identified   Sensory Examination   Sensory Perception no deficits were identified   Coordination   Coordination no deficits were identified   Muscle Tone   Muscle Tone no deficits were identified   Modality Interventions   Planned Modality Interventions Cryotherapy   General Therapy Interventions   Planned Therapy Interventions other (see comments)   Intervention Comments Patient functionally independent.  Provided with TKA manual and advised to continue to practice exercise program.   Clinical Impression   Criteria for Skilled Therapeutic Intervention evaluation only;no problems identified which require skilled intervention   PT Diagnosis Possible postop functional impairment.   Influenced by the following impairments Mild postop pain.   Functional limitations due to impairments  "slight limp on L.  Patient advised to use her cane on R.   Clinical Presentation Stable/Uncomplicated   Clinical Presentation Rationale Functional assessment and clinical judgement.   Clinical Decision Making (Complexity) Low complexity   Therapy Frequency` other (see comments)  (one session for Eval only.)   Predicted Duration of Therapy Intervention (days/wks) One time only.   Anticipated Equipment Needs at Discharge standard cane   Anticipated Discharge Disposition Home with Assist   Risk & Benefits of therapy have been explained Yes   Patient, Family & other staff in agreement with plan of care Yes   [facility] TM \"6 Clicks\"   2016, Trustees of [facility], under license to [employer].  All rights reserved.   6 Clicks Short Forms Basic Mobility Inpatient Short Form   [facility]  \"6 Clicks\" V.2 Basic Mobility Inpatient Short Form   1. Turning from your back to your side while in a flat bed without using bedrails? 4 - None   2. Moving from lying on your back to sitting on the side of a flat bed without using bedrails? 4 - None   3. Moving to and from a bed to a chair (including a wheelchair)? 4 - None   4. Standing up from a chair using your arms (e.g., wheelchair, or bedside chair)? 4 - None   5. To walk in hospital room? 4 - None   6. Climbing 3-5 steps with a railing? 4 - None   Basic Mobility Raw Score (Score out of 24.Lower scores equate to lower levels of function) 24   Total Evaluation Time   Total Evaluation Time (Minutes) 30     "

## 2017-12-20 NOTE — PLAN OF CARE
Problem: Patient Care Overview  Goal: Plan of Care/Patient Progress Review  PT: Orders received, chart reviewed, Eval completed and patient found to be functionally independent with no continued need for skilled PT at this time.  Patient given TKA Manual with brief review of exercise program.  Goals of referral met .    Physical Therapy Discharge Summary    Reason for therapy discharge:    All goals and outcomes met, no further needs identified.    Progress towards therapy goal(s). See goals on Care Plan in [facility] electronic health record for goal details.  Goals met    Therapy recommendation(s):    No further therapy is recommended.  Continue home exercise program.

## 2017-12-21 ENCOUNTER — THERAPY VISIT (OUTPATIENT)
Dept: PHYSICAL THERAPY | Facility: CLINIC | Age: 77
End: 2017-12-21
Payer: MEDICARE

## 2017-12-21 DIAGNOSIS — Z96.652 PRESENCE OF LEFT ARTIFICIAL KNEE JOINT: ICD-10-CM

## 2017-12-21 DIAGNOSIS — Z47.1 AFTERCARE FOLLOWING LEFT KNEE JOINT REPLACEMENT SURGERY: Primary | ICD-10-CM

## 2017-12-21 DIAGNOSIS — Z96.652 AFTERCARE FOLLOWING LEFT KNEE JOINT REPLACEMENT SURGERY: Primary | ICD-10-CM

## 2017-12-21 PROCEDURE — G8979 MOBILITY GOAL STATUS: HCPCS | Mod: GP | Performed by: PHYSICAL THERAPIST

## 2017-12-21 PROCEDURE — 97110 THERAPEUTIC EXERCISES: CPT | Mod: GP | Performed by: PHYSICAL THERAPIST

## 2017-12-21 PROCEDURE — 97161 PT EVAL LOW COMPLEX 20 MIN: CPT | Mod: GP | Performed by: PHYSICAL THERAPIST

## 2017-12-21 PROCEDURE — G8978 MOBILITY CURRENT STATUS: HCPCS | Mod: GP | Performed by: PHYSICAL THERAPIST

## 2017-12-21 ASSESSMENT — ACTIVITIES OF DAILY LIVING (ADL)
SIT WITH YOUR KNEE BENT: ACTIVITY IS SOMEWHAT DIFFICULT
KNEEL ON THE FRONT OF YOUR KNEE: ACTIVITY IS VERY DIFFICULT
STIFFNESS: THE SYMPTOM AFFECTS MY ACTIVITY SEVERELY
GO UP STAIRS: ACTIVITY IS FAIRLY DIFFICULT
STAND: ACTIVITY IS SOMEWHAT DIFFICULT
LIMPING: THE SYMPTOM AFFECTS MY ACTIVITY SEVERELY
AS_A_RESULT_OF_YOUR_KNEE_INJURY,_HOW_WOULD_YOU_RATE_YOUR_CURRENT_LEVEL_OF_DAILY_ACTIVITY?: ABNORMAL
RAW_SCORE: 25.85
GIVING WAY, BUCKLING OR SHIFTING OF KNEE: NOT ANSWERED
KNEE_ACTIVITY_OF_DAILY_LIVING_SCORE: 36.93
GO DOWN STAIRS: ACTIVITY IS FAIRLY DIFFICULT
WALK: ACTIVITY IS SOMEWHAT DIFFICULT
SQUAT: ACTIVITY IS VERY DIFFICULT
WEAKNESS: THE SYMPTOM AFFECTS MY ACTIVITY SEVERELY
KNEE_ACTIVITY_OF_DAILY_LIVING_SUM: 24
HOW_WOULD_YOU_RATE_THE_OVERALL_FUNCTION_OF_YOUR_KNEE_DURING_YOUR_USUAL_DAILY_ACTIVITIES?: ABNORMAL
SWELLING: THE SYMPTOM AFFECTS MY ACTIVITY MODERATELY
PAIN: THE SYMPTOM AFFECTS MY ACTIVITY SEVERELY
RISE FROM A CHAIR: ACTIVITY IS SOMEWHAT DIFFICULT

## 2017-12-21 NOTE — PROGRESS NOTES
[facility] Evaluation    Subjective:    Patient is a 77 year old female presenting with rehab left knee hpi.   Suzi Amaya is a 77 year old female with a left knee condition.  Condition occurred with:  A fall/slip.  Condition occurred: in the community.  This is a new condition  12/18/17 L TKA revision (tibial component).  Pt stated she fell while walking her dog about 2 years go, has had symptoms since that time.  Pt stated she has been very sore since surgery, but the swelling isn't bad. Uses CPM, ices mostly in the evenings.  Walks with cane in community, without AD in home.  Was not using a device prior to surgery.    Patient reports pain:  Anterior.  Radiates to:  Lower leg and thigh.  Pain is described as aching and sharp and is constant and reported as 8/10.  Associated symptoms:  Edema, loss of motion/stiffness and loss of strength. Pain is the same all the time.  Symptoms are exacerbated by descending stairs, ascending stairs, standing, walking and bending/squatting and relieved by ice and analgesics.  Since onset symptoms are gradually improving.        General health as reported by patient is good.  Pertinent medical history includes:  High blood pressure, heart problems and migraines (BP controlled; L bundle branch block; migraines come intermittently).  Medical allergies: yes (see Epic).  Other surgeries include:  Orthopedic surgery (B TKAs).  Current medications:  Cardiac medication, pain medication and high blood pressure medication.  Current occupation is musician - teaches and plays flute.  Patient is working in normal job without restrictions.  Primary job tasks include:  Other (sits on stool to play flute; household tasks).    Barriers include:  Stairs.    Red flags:  None as reported by the patient.                        Objective:    Standing Alignment:              Knee:  Genu varus L                Physical Exam     Functional Tests:  Not assessed  today    Sensation:  Light touch intact and symmetrical B LEs except impaired L lateral knee and lateral ankle    Strength:  Hip flexion: R 4+/5, L 4/5 pain at L knee  Knee extension: R 5/5, L at least 3/5, not resisted due to recent surgery, pain with active hold  Dorsiflexion: B 5/5, pain L knee  Glut med: R 4/5, L 3+/5 pain free  Glut max: L 5/5, R 4+/5, pain L knee  Hamstrings: B 5/5, pain L knee    Knee ROM:  Motion L R Comments   Active flexion 90 120 Pain L knee   Passive extension Lacks 4 deg 4 deg hyper Pain L knee   Passive flexion 108 NT Pain L knee     Palpation:  Not assessed today    Edema:  Moderate around L knee    Gait:  R trunk shift, decreased WB L, lacks terminal L knee extension    General     ROS    Assessment/Plan:      Patient is a 77 year old female with left side knee complaints.    Patient has the following significant findings with corresponding treatment plan.                Diagnosis 1:  S/p L TKA revision  Pain -  hot/cold therapy, manual therapy, education and home program  Decreased ROM/flexibility - manual therapy, therapeutic exercise, therapeutic activity and home program  Decreased strength - therapeutic exercise, therapeutic activities and home program  Edema - cold therapy and self management/home program  Impaired gait - gait training, assistive devices and home program  Decreased function - therapeutic activities and home program    Therapy Evaluation Codes:   1) History comprised of:   Personal factors that impact the plan of care:      Age and Past/current experiences.    Comorbidity factors that impact the plan of care are:      Heart problems and Migraines/headaches.     Medications impacting care: Cardiac, High blood pressure and Pain.  2) Examination of Body Systems comprised of:   Body structures and functions that impact the plan of care:      Knee.   Activity limitations that impact the plan of care are:      Bending, Stairs, Standing and Walking.  3) Clinical  presentation characteristics are:   Stable/Uncomplicated.  4) Decision-Making    Low complexity using standardized patient assessment instrument and/or measureable assessment of functional outcome.  Cumulative Therapy Evaluation is: Low complexity.    Previous and current functional limitations:  (See Goal Flow Sheet for this information)    Short term and Long term goals: (See Goal Flow Sheet for this information)     Communication ability:  Patient appears to be able to clearly communicate and understand verbal and written communication and follow directions correctly.  Treatment Explanation - The following has been discussed with the patient:   RX ordered/plan of care  Anticipated outcomes  Possible risks and side effects  This patient would benefit from PT intervention to resume normal activities.   Rehab potential is good.    Frequency:  2 X week, once daily  Duration:  for 2 weeks tapering to 1 X a week over 8 weeks  Discharge Plan:  Achieve all LTG.  Independent in home treatment program.  Reach maximal therapeutic benefit.    Please refer to the daily flowsheet for treatment today, total treatment time and time spent performing 1:1 timed codes.

## 2017-12-21 NOTE — MR AVS SNAPSHOT
After Visit Summary   12/21/2017    Suzi Amaya    MRN: [id number]           Patient Information     Date Of Birth          1940        Visit Information        Provider Department      12/21/2017 2:40 PM Jesusita Bethea, PT [facility] Physical Therapy        Today's Diagnoses     Aftercare following left knee joint replacement surgery    -  1    Presence of left artificial knee joint           Follow-ups after your visit        Your next 10 appointments already scheduled     Dec 27, 2017  3:20 PM CST   LUIS Extremity with Shelli Richardson ATC   [facility] Physical Therapy ([facility]  )    [address] [place] [id number]   [phone]            Dec 29, 2017  4:00 PM CST   LUIS Extremity with Lucas Christie PT   [facility] ([facility]  )    [address] [place] [id number] [phone]            Jan 03, 2018  3:20 PM CST   LUIS Extremity with Shelli Richardson ATC   [facility] ([facility]  )    [address] [place] [id number] [phone]              Who to contact     If you have questions or need follow up information about today's clinic visit or your schedule please contact [facility] PHYSICAL THERAPY directly at [phone].  Normal or non-critical lab and imaging results will be communicated to you by MyChart, letter or phone within 4 business days after the clinic has received the results. If you do not hear from us within 7 days, please contact the clinic through MyChart or phone. If you have a critical or abnormal lab result, we will notify you by phone as soon as possible.  Submit refill requests through [facility] ([facility]) or call your pharmacy and they will forward the refill request to us. Please allow 3 business days for your  "refill to be completed.          Additional Information About Your Visit        [employer] Information     [employer] lets you send messages to your doctor, view your test results, renew your prescriptions, schedule appointments and more. To sign up, go to [email or web address] . Click on \"Log in\" on the left side of the screen, which will take you to the Welcome page. Then click on \"Sign up Now\" on the right side of the page.     You will be asked to enter the access code listed below, as well as some personal information. Please follow the directions to create your username and password.     Your access code is: [email or web address]  Expires: 3/19/2018  9:53 AM     Your access code will  in 90 days. If you need help or a new code, please call your [place] clinic or [phone].        Care EveryWhere ID     This is your Care EveryWhere ID. This could be used by other organizations to access your [place] medical records  [id number]         Blood Pressure from Last 3 Encounters:   17 104/63    Weight from Last 3 Encounters:   17 66.5 kg (146 lb 11.2 oz)              We Performed the Following     HC PT EVAL, LOW COMPLEXITY     LUIS CERT REPORT     THERAPEUTIC EXERCISES        Primary Care Provider Fax #    Physician No Ref-Primary [phone]       No address on file        Equal Access to Services     RACHELLE BELLAMY : Hadii suresh hennessyo Sokelly, waaxda luqadaha, qaybta kaalmada adeegtanna, rogerio king . So [facility] [phone].    ATENCIÓN: Si habla español, tiene a king disposición servicios gratuitos de asistencia lingüística. Llame al [phone].    We comply with applicable federal civil rights laws and [place] laws. We do not discriminate on the basis of race, color, national origin, age, disability, sex, sexual orientation, or gender identity.            Thank you!     Thank you for choosing [facility] PHYSICAL [facility]  for your care. " Our goal is always to provide you with excellent care. Hearing back from our patients is one way we can continue to improve our services. Please take a few minutes to complete the written survey that you may receive in the mail after your visit with us. Thank you!             Your Updated Medication List - Protect others around you: Learn how to safely use, store and throw away your medicines at www.disposemymeds.org.          This list is accurate as of: 12/21/17 11:59 PM.  Always use your most recent med list.                   Brand Name Dispense Instructions for use Diagnosis    acetaminophen 325 MG tablet    TYLENOL    100 tablet    Take 1 tablet (325 mg) by mouth every 4 hours as needed for mild pain or fever    Hx of total knee arthroplasty, left       albuterol 108 (90 BASE) MCG/ACT Inhaler    PROAIR HFA/PROVENTIL HFA/VENTOLIN HFA     Inhale 1-2 puffs into the lungs every 4 hours as needed for shortness of breath / dyspnea or wheezing        ALLERGY INJECTIONS      Inject 1 Dose as directed every 30 days Given at [facility]        B-12 PO      Take 1 tablet by mouth daily        butalbital-aspirin-caffeine -40 MG per capsule    FIORINAL     Take 1 capsule by mouth 2 times daily as needed for headaches        CARVEDILOL PO      Take 12.5 mg by mouth 2 times daily        CELEBREX PO      Take 100 mg by mouth daily as needed for moderate pain        ELIQUIS PO      Take 5 mg by mouth 2 times daily        HYDROcodone-acetaminophen 5-325 MG per tablet    NORCO    40 tablet    Take 1-2 tablets by mouth every 4 hours as needed for moderate to severe pain    Hx of total knee arthroplasty, left       LOSARTAN POTASSIUM PO      Take 100 mg by mouth At Bedtime        multivitamin, therapeutic with minerals Tabs tablet      Take 1 tablet by mouth daily        OMEPRAZOLE PO      Take 40 mg by mouth daily (with dinner)        ondansetron 4 MG ODT tab    ZOFRAN-ODT    20 tablet    Take 1 tablet (4 mg) by  mouth every 6 hours as needed for nausea or vomiting    Hx of total knee arthroplasty, left       senna-docusate 8.6-50 MG per tablet    SENOKOT-S;PERICOLACE    50 tablet    Take 1-2 tablets by mouth 2 times daily    Hx of total knee arthroplasty, left       traMADol 50 MG tablet    ULTRAM    20 tablet    Take 1 tablet (50 mg) by mouth every 6 hours as needed for moderate pain    Hx of total knee arthroplasty, left       triamterene-hydrochlorothiazide 37.5-25 MG per tablet    MAXZIDE-25     Take 1 tablet by mouth every morning        VITAMIN D (CHOLECALCIFEROL) PO      Take 1 tablet by mouth daily (with dinner)

## 2017-12-21 NOTE — LETTER
DEPARTMENT OF HEALTH AND HUMAN SERVICES  CENTERS FOR MEDICARE & MEDICAID SERVICES    PLAN/UPDATED PLAN OF PROGRESS FOR OUTPATIENT REHABILITATION    PATIENTS NAME:  Suzi Amaya   : 1940  PROVID[facility] NUMBER:    [id number]  [id number]   PROVIDER NAME: [facility]  MEDICAL RECORD NUMBER: [id number]   START OF CARE DATE:  SOC Date: 17   TYPE:  PT  PRIMARY/TREATMENT DIAGNOSIS: (Pertinent Medical Diagnosis)  Aftercare following left knee joint replacement surgery  Presence of left artificial knee joint  VISITS FROM START OF CARE:  1  Rxs Used: 1     [facility] Evaluation    Subjective:  Patient is a 77 year old female presenting with rehab left knee hpi.   Suzi Amaya is a 77 year old female with a left knee condition.  Condition occurred with:  A fall/slip.  Condition occurred: in the community.  This is a new condition  17 L TKA revision (tibial component).  Pt stated she fell while walking her dog about 2 years go, has had symptoms since that time.  Pt stated she has been very sore since surgery, but the swelling isn't bad. Uses CPM, ices mostly in the evenings.  Walks with cane in community, without AD in home.  Was not using a device prior to surgery.    Patient reports pain:  Anterior.  Radiates to:  Lower leg and thigh.  Pain is described as aching and sharp and is constant and reported as 8/10.  Associated symptoms:  Edema, loss of motion/stiffness and loss of strength. Pain is the same all the time.  Symptoms are exacerbated by descending stairs, ascending stairs, standing, walking and bending/squatting and relieved by ice and analgesics.  Since onset symptoms are gradually improving.        General health as reported by patient is good.  Pertinent medical history includes:  High blood pressure, heart problems and migraines (BP controlled; L bundle branch block; migraines come intermittently).  Medical  allergies: yes (see Epic).  Other surgeries include:  Orthopedic surgery (B TKAs).  Current medications:  Cardiac medication, pain medication and high blood pressure medication.  Current occupation is musician - teaches and plays flute.  Patient is working in normal job without restrictions.  Primary job tasks include:  Other (sits on stool to play flute; household tasks).  Barriers include:  Stairs.  Red flags:  None as reported by the patient.    Objective:  Standing Alignment:    Knee:  Genu varus L  Functional Tests:  Not assessed today  Sensation:  Light touch intact and symmetrical B LEs except impaired L lateral knee and lateral ankle  Strength:  Hip flexion: R 4+/5, L 4/5 pain at L knee    PATIENTS NAME:  Suzi Amaya   : 1940    Knee extension: R 5/5, L at least 3/5, not resisted due to recent surgery, pain with active hold  Dorsiflexion: B 5/5, pain L knee  Glut med: R 4/5, L 3+/5 pain free  Glut max: L 5/5, R 4+/5, pain L knee  Hamstrings: B 5/5, pain L knee  Knee ROM:  Motion L R Comments   Active flexion 90 120 Pain L knee   Passive extension Lacks 4 deg 4 deg hyper Pain L knee   Passive flexion 108 NT Pain L knee   Palpation:  Not assessed today  Edema:  Moderate around L knee  Gait:  R trunk shift, decreased WB L, lacks terminal L knee extension    Assessment/Plan:     Patient is a 77 year old female with left side knee complaints.    Patient has the following significant findings with corresponding treatment plan.                Diagnosis 1:  S/p L TKA revision  Pain -  hot/cold therapy, manual therapy, education and home program  Decreased ROM/flexibility - manual therapy, therapeutic exercise, therapeutic activity and home program  Decreased strength - therapeutic exercise, therapeutic activities and home program  Edema - cold therapy and self management/home program  Impaired gait - gait training, assistive devices and home program  Decreased function - therapeutic activities and home  program  Therapy Evaluation Codes:   1) History comprised of:   Personal factors that impact the plan of care:      Age and Past/current experiences.    Comorbidity factors that impact the plan of care are:      Heart problems and Migraines/headaches.     Medications impacting care: Cardiac, High blood pressure and Pain.  2) Examination of Body Systems comprised of:   Body structures and functions that impact the plan of care:      Knee.   Activity limitations that impact the plan of care are:      Bending, Stairs, Standing and Walking.  3) Clinical presentation characteristics are:   Stable/Uncomplicated.  4) Decision-Making    Low complexity using standardized patient assessment instrument and/or measureable assessment of functional outcome.  Cumulative Therapy Evaluation is: Low complexity.  Previous and current functional limitations:  (See Goal Flow Sheet for this information)      PATIENTS NAME:  Suzi Amaya   : 1940    Short term and Long term goals: (See Goal Flow Sheet for this information)   Communication ability:  Patient appears to be able to clearly communicate and understand verbal and written communication and follow directions correctly.  Treatment Explanation - The following has been discussed with the patient:   RX ordered/plan of care  Anticipated outcomes  Possible risks and side effects  This patient would benefit from PT intervention to resume normal activities.   Rehab potential is good.  Frequency:  2 X week, once daily  Duration:  for 2 weeks tapering to 1 X a week over 8 weeks  Discharge Plan:  Achieve all LTG.  Independent in home treatment program.  Reach maximal therapeutic benefit.                  Caregiver Signature/Credentials _____________________________ Date ________           Jesusita Bethea PT, DPT   I have reviewed and certified the need for these services and plan of treatment while under my care.        PHYSICIAN'S SIGNATURE:   _____________________________________   "Date___________     Blu Mitchell MD    Certification period:  Beginning of Cert date period: 12/21/17 to  End of Cert period date: 03/20/18     Functional Level Progress Report: Please see attached \"Goal Flow sheet for Functional level.\"    ____X____ Continue Services or       ________ DC Services                Service dates: From  SOC Date: 12/21/17 date to present                         "

## 2017-12-22 PROBLEM — Z47.1 AFTERCARE FOLLOWING LEFT KNEE JOINT REPLACEMENT SURGERY: Status: ACTIVE | Noted: 2017-12-22

## 2017-12-22 PROBLEM — Z96.652 PRESENCE OF LEFT ARTIFICIAL KNEE JOINT: Status: ACTIVE | Noted: 2017-12-22

## 2017-12-22 PROBLEM — Z96.652 AFTERCARE FOLLOWING LEFT KNEE JOINT REPLACEMENT SURGERY: Status: ACTIVE | Noted: 2017-12-22

## 2017-12-22 NOTE — DISCHARGE SUMMARY
DATE OF ADMISSION:  2017.       DATE OF DISCHARGE:  2017.       ADMITTING DIAGNOSIS:  Failed tibial component, left total knee arthroplasty with instability.       POSTOPERATIVE DIAGNOSIS:  Failed tibial component, left total knee arthroplasty with instability.        DISCHARGE DIAGNOSIS:  Failed tibial component, left total knee arthroplasty with instability.      PROCEDURE:  Revision left total knee arthroplasty with polyethylene exchange.      INTRAOPERATIVE AND PERIOPERATIVE COMPLICATIONS:  None apparent.      HISTORY AND HOSPITAL COURSE:  Suzi Amaya was taken to the operating room the same day as admission and underwent an uneventful revision of left knee arthroplasty.  She is currently afebrile.  Wounds are benign, active straight leg raising, pain well managed.  Discharged to home on Norco and Tylenol for pain, augmented with a single [place] and Tylenol.  Outpatient therapy has been arranged, home CPM provided.  She will reinstitute all home medications including anticoagulation.  At the time of discharge, felt to be stable and independent ADL function and pain well managed.         IRON BERG MD             D: 2017 08:36   T: 2017 08:45   MT: FESTUS      Name:     SUZI AMAYA   MRN:      [id number]        Account:        [id number]   :      1940           Admit Date:     [id number]                                  Discharge Date: 2017      Document: [id number]

## 2017-12-27 ENCOUNTER — THERAPY VISIT (OUTPATIENT)
Dept: PHYSICAL THERAPY | Facility: CLINIC | Age: 77
End: 2017-12-27
Payer: MEDICARE

## 2017-12-27 DIAGNOSIS — Z96.652 AFTERCARE FOLLOWING LEFT KNEE JOINT REPLACEMENT SURGERY: ICD-10-CM

## 2017-12-27 DIAGNOSIS — Z47.1 AFTERCARE FOLLOWING LEFT KNEE JOINT REPLACEMENT SURGERY: ICD-10-CM

## 2017-12-27 DIAGNOSIS — Z96.652 PRESENCE OF LEFT ARTIFICIAL KNEE JOINT: ICD-10-CM

## 2017-12-27 PROCEDURE — 97110 THERAPEUTIC EXERCISES: CPT | Mod: GP

## 2017-12-27 PROCEDURE — G8979 MOBILITY GOAL STATUS: HCPCS | Mod: GP

## 2017-12-27 PROCEDURE — 97530 THERAPEUTIC ACTIVITIES: CPT | Mod: GP

## 2017-12-27 PROCEDURE — G8978 MOBILITY CURRENT STATUS: HCPCS | Mod: GP

## 2017-12-29 ENCOUNTER — THERAPY VISIT (OUTPATIENT)
Dept: PHYSICAL THERAPY | Facility: CLINIC | Age: 77
End: 2017-12-29
Payer: MEDICARE

## 2017-12-29 DIAGNOSIS — Z47.1 AFTERCARE FOLLOWING LEFT KNEE JOINT REPLACEMENT SURGERY: ICD-10-CM

## 2017-12-29 DIAGNOSIS — Z96.652 AFTERCARE FOLLOWING LEFT KNEE JOINT REPLACEMENT SURGERY: ICD-10-CM

## 2017-12-29 DIAGNOSIS — Z96.652 PRESENCE OF LEFT ARTIFICIAL KNEE JOINT: ICD-10-CM

## 2017-12-29 PROCEDURE — 97530 THERAPEUTIC ACTIVITIES: CPT | Mod: GP | Performed by: PHYSICAL THERAPIST

## 2017-12-29 PROCEDURE — 97110 THERAPEUTIC EXERCISES: CPT | Mod: GP | Performed by: PHYSICAL THERAPIST

## 2018-05-18 ENCOUNTER — TRANSFERRED RECORDS (OUTPATIENT)
Dept: HEALTH INFORMATION MANAGEMENT | Facility: CLINIC | Age: 78
End: 2018-05-18

## 2018-05-24 NOTE — PHARMACY-ADMISSION MEDICATION HISTORY
Medication reconciliation completed by pre-admitting.    Prior to Admission medications    Medication Sig Last Dose Taking? Auth Provider   acetaminophen (TYLENOL) 325 MG tablet Take 1 tablet (325 mg) by mouth every 4 hours as needed for mild pain or fever  Yes Yadi Cruz PA-C   albuterol (PROAIR HFA/PROVENTIL HFA/VENTOLIN HFA) 108 (90 BASE) MCG/ACT Inhaler Inhale 1-2 puffs into the lungs every 4 hours as needed for shortness of breath / dyspnea or wheezing  Yes Reported, Patient   ALLERGY INJECTIONS Inject 1 Dose as directed every 30 days Given at [facility]  Yes Reported, Patient   Apixaban (ELIQUIS PO) Take 5 mg by mouth 2 times daily  Yes Reported, Patient   butalbital-aspirin-caffeine (FIORINAL) -40 MG per capsule Take 1 capsule by mouth 2 times daily as needed for headaches   Yes Reported, Patient   CARVEDILOL PO Take 12.5 mg by mouth 2 times daily  Yes Reported, Patient   Cyanocobalamin (B-12 PO) Take 1 tablet by mouth daily  Yes Reported, Patient   LOSARTAN POTASSIUM PO Take 100 mg by mouth At Bedtime  Yes Reported, Patient   multivitamin, therapeutic with minerals (THERA-VIT-M) TABS tablet Take 1 tablet by mouth daily  Yes Reported, Patient   OMEPRAZOLE PO Take 40 mg by mouth daily (with dinner)   Yes Reported, Patient   ondansetron (ZOFRAN-ODT) 4 MG ODT tab Take 1 tablet (4 mg) by mouth every 6 hours as needed for nausea or vomiting  Yes Yadi Cruz PA-C   senna-docusate (SENOKOT-S;PERICOLACE) 8.6-50 MG per tablet Take 1-2 tablets by mouth 2 times daily  Yes Yadi Cruz PA-C   triamterene-hydrochlorothiazide (MAXZIDE-25) 37.5-25 MG per tablet Take 1 tablet by mouth every morning  Yes Reported, Patient   VITAMIN D, CHOLECALCIFEROL, PO Take 1 tablet by mouth daily (with dinner)  Yes Reported, Patient

## 2018-05-29 ENCOUNTER — ANESTHESIA (OUTPATIENT)
Dept: SURGERY | Facility: CLINIC | Age: 78
DRG: 467 | End: 2018-05-29
Payer: MEDICARE

## 2018-05-29 ENCOUNTER — APPOINTMENT (OUTPATIENT)
Dept: GENERAL RADIOLOGY | Facility: CLINIC | Age: 78
DRG: 467 | End: 2018-05-29
Attending: ORTHOPAEDIC SURGERY
Payer: MEDICARE

## 2018-05-29 ENCOUNTER — HOSPITAL ENCOUNTER (INPATIENT)
Facility: CLINIC | Age: 78
LOS: 2 days | Discharge: HOME | DRG: 467 | End: 2018-05-31
Attending: ORTHOPAEDIC SURGERY | Admitting: ORTHOPAEDIC SURGERY
Payer: MEDICARE

## 2018-05-29 ENCOUNTER — ANESTHESIA EVENT (OUTPATIENT)
Dept: SURGERY | Facility: CLINIC | Age: 78
DRG: 467 | End: 2018-05-29
Payer: MEDICARE

## 2018-05-29 DIAGNOSIS — Z96.659 STATUS POST REVISION OF TOTAL KNEE REPLACEMENT, UNSPECIFIED LATERALITY: Primary | ICD-10-CM

## 2018-05-29 PROBLEM — M25.562 KNEE PAIN, LEFT: Status: ACTIVE | Noted: 2018-05-29

## 2018-05-29 LAB — INR PPP: 1.07 (ref 0.86–1.14)

## 2018-05-29 PROCEDURE — 36000067 ZZH SURGERY LEVEL 5 1ST 30 MIN: Performed by: ORTHOPAEDIC SURGERY

## 2018-05-29 PROCEDURE — 40000171 ZZH STATISTIC PRE-PROCEDURE ASSESSMENT III: Performed by: ORTHOPAEDIC SURGERY

## 2018-05-29 PROCEDURE — C1776 JOINT DEVICE (IMPLANTABLE): HCPCS | Performed by: ORTHOPAEDIC SURGERY

## 2018-05-29 PROCEDURE — 36000069 ZZH SURGERY LEVEL 5 EA 15 ADDTL MIN: Performed by: ORTHOPAEDIC SURGERY

## 2018-05-29 PROCEDURE — 36415 COLL VENOUS BLD VENIPUNCTURE: CPT | Performed by: ORTHOPAEDIC SURGERY

## 2018-05-29 PROCEDURE — 25000128 H RX IP 250 OP 636: Performed by: ORTHOPAEDIC SURGERY

## 2018-05-29 PROCEDURE — 85610 PROTHROMBIN TIME: CPT | Performed by: ORTHOPAEDIC SURGERY

## 2018-05-29 PROCEDURE — 25000132 ZZH RX MED GY IP 250 OP 250 PS 637: Mod: GY | Performed by: PHYSICIAN ASSISTANT

## 2018-05-29 PROCEDURE — 25000125 ZZHC RX 250: Performed by: NURSE ANESTHETIST, CERTIFIED REGISTERED

## 2018-05-29 PROCEDURE — 25800025 ZZH RX 258: Performed by: ORTHOPAEDIC SURGERY

## 2018-05-29 PROCEDURE — 25000125 ZZHC RX 250: Performed by: ORTHOPAEDIC SURGERY

## 2018-05-29 PROCEDURE — 12000007 ZZH R&B INTERMEDIATE

## 2018-05-29 PROCEDURE — 25000128 H RX IP 250 OP 636: Performed by: NURSE ANESTHETIST, CERTIFIED REGISTERED

## 2018-05-29 PROCEDURE — 71000012 ZZH RECOVERY PHASE 1 LEVEL 1 FIRST HR: Performed by: ORTHOPAEDIC SURGERY

## 2018-05-29 PROCEDURE — 0SRD0J9 REPLACEMENT OF LEFT KNEE JOINT WITH SYNTHETIC SUBSTITUTE, CEMENTED, OPEN APPROACH: ICD-10-PCS | Performed by: ORTHOPAEDIC SURGERY

## 2018-05-29 PROCEDURE — 25000128 H RX IP 250 OP 636: Performed by: ANESTHESIOLOGY

## 2018-05-29 PROCEDURE — 71000013 ZZH RECOVERY PHASE 1 LEVEL 1 EA ADDTL HR: Performed by: ORTHOPAEDIC SURGERY

## 2018-05-29 PROCEDURE — 40000985 XR KNEE PORT LT 1/2 VW: Mod: LT

## 2018-05-29 PROCEDURE — A9270 NON-COVERED ITEM OR SERVICE: HCPCS | Mod: GY | Performed by: PHYSICIAN ASSISTANT

## 2018-05-29 PROCEDURE — 25000125 ZZHC RX 250: Performed by: ANESTHESIOLOGY

## 2018-05-29 PROCEDURE — 25000566 ZZH SEVOFLURANE, EA 15 MIN: Performed by: ORTHOPAEDIC SURGERY

## 2018-05-29 PROCEDURE — A9270 NON-COVERED ITEM OR SERVICE: HCPCS | Mod: GY | Performed by: ORTHOPAEDIC SURGERY

## 2018-05-29 PROCEDURE — C1713 ANCHOR/SCREW BN/BN,TIS/BN: HCPCS | Performed by: ORTHOPAEDIC SURGERY

## 2018-05-29 PROCEDURE — 0SPD0JZ REMOVAL OF SYNTHETIC SUBSTITUTE FROM LEFT KNEE JOINT, OPEN APPROACH: ICD-10-PCS | Performed by: ORTHOPAEDIC SURGERY

## 2018-05-29 PROCEDURE — 27810169 ZZH OR IMPLANT GENERAL: Performed by: ORTHOPAEDIC SURGERY

## 2018-05-29 PROCEDURE — 37000009 ZZH ANESTHESIA TECHNICAL FEE, EACH ADDTL 15 MIN: Performed by: ORTHOPAEDIC SURGERY

## 2018-05-29 PROCEDURE — 25000132 ZZH RX MED GY IP 250 OP 250 PS 637: Mod: GY | Performed by: ORTHOPAEDIC SURGERY

## 2018-05-29 PROCEDURE — 37000008 ZZH ANESTHESIA TECHNICAL FEE, 1ST 30 MIN: Performed by: ORTHOPAEDIC SURGERY

## 2018-05-29 PROCEDURE — 27210794 ZZH OR GENERAL SUPPLY STERILE: Performed by: ORTHOPAEDIC SURGERY

## 2018-05-29 DEVICE — IMPLANTABLE DEVICE: Type: IMPLANTABLE DEVICE | Site: KNEE | Status: FUNCTIONAL

## 2018-05-29 DEVICE — BONE CEMENT RESTRICTOR BUCK FEMORAL 18.5MM 129418: Type: IMPLANTABLE DEVICE | Site: KNEE | Status: FUNCTIONAL

## 2018-05-29 DEVICE — IMP INSERT BASEPLATE TIBIAL STRK TRI 2 5521-B-200: Type: IMPLANTABLE DEVICE | Site: KNEE | Status: FUNCTIONAL

## 2018-05-29 DEVICE — IMP COMP PATELLA STRK TRI SYM X3 29X8MM 5550-G-298: Type: IMPLANTABLE DEVICE | Site: KNEE | Status: FUNCTIONAL

## 2018-05-29 DEVICE — BONE CEMENT SIMPLEX FULL DOSE 6191-1-001: Type: IMPLANTABLE DEVICE | Site: KNEE | Status: FUNCTIONAL

## 2018-05-29 DEVICE — BONE CEMENT RESTRICTOR BUCK FEMORAL 25MM 129419: Type: IMPLANTABLE DEVICE | Site: KNEE | Status: FUNCTIONAL

## 2018-05-29 RX ORDER — ONDANSETRON 4 MG/1
4 TABLET, ORALLY DISINTEGRATING ORAL EVERY 30 MIN PRN
Status: DISCONTINUED | OUTPATIENT
Start: 2018-05-29 | End: 2018-05-29 | Stop reason: HOSPADM

## 2018-05-29 RX ORDER — NALOXONE HYDROCHLORIDE 0.4 MG/ML
.1-.4 INJECTION, SOLUTION INTRAMUSCULAR; INTRAVENOUS; SUBCUTANEOUS
Status: ACTIVE | OUTPATIENT
Start: 2018-05-29 | End: 2018-05-30

## 2018-05-29 RX ORDER — FENTANYL CITRATE 50 UG/ML
25-50 INJECTION, SOLUTION INTRAMUSCULAR; INTRAVENOUS
Status: DISCONTINUED | OUTPATIENT
Start: 2018-05-29 | End: 2018-05-29 | Stop reason: HOSPADM

## 2018-05-29 RX ORDER — SODIUM CHLORIDE, SODIUM LACTATE, POTASSIUM CHLORIDE, CALCIUM CHLORIDE 600; 310; 30; 20 MG/100ML; MG/100ML; MG/100ML; MG/100ML
INJECTION, SOLUTION INTRAVENOUS CONTINUOUS
Status: CANCELLED | OUTPATIENT
Start: 2018-05-29

## 2018-05-29 RX ORDER — BUPIVACAINE HYDROCHLORIDE AND EPINEPHRINE 2.5; 5 MG/ML; UG/ML
30 INJECTION, SOLUTION EPIDURAL; INFILTRATION; INTRACAUDAL; PERINEURAL ONCE
Status: DISCONTINUED | OUTPATIENT
Start: 2018-05-29 | End: 2018-05-29 | Stop reason: HOSPADM

## 2018-05-29 RX ORDER — HYDROMORPHONE HYDROCHLORIDE 1 MG/ML
.3-.5 INJECTION, SOLUTION INTRAMUSCULAR; INTRAVENOUS; SUBCUTANEOUS
Status: DISCONTINUED | OUTPATIENT
Start: 2018-05-29 | End: 2018-05-31 | Stop reason: HOSPADM

## 2018-05-29 RX ORDER — KETOROLAC TROMETHAMINE 30 MG/ML
INJECTION, SOLUTION INTRAMUSCULAR; INTRAVENOUS PRN
Status: DISCONTINUED | OUTPATIENT
Start: 2018-05-29 | End: 2018-05-29

## 2018-05-29 RX ORDER — METOCLOPRAMIDE 5 MG/1
5 TABLET ORAL EVERY 6 HOURS PRN
Status: DISCONTINUED | OUTPATIENT
Start: 2018-05-29 | End: 2018-05-31 | Stop reason: HOSPADM

## 2018-05-29 RX ORDER — SODIUM CHLORIDE, SODIUM LACTATE, POTASSIUM CHLORIDE, CALCIUM CHLORIDE 600; 310; 30; 20 MG/100ML; MG/100ML; MG/100ML; MG/100ML
INJECTION, SOLUTION INTRAVENOUS CONTINUOUS
Status: DISCONTINUED | OUTPATIENT
Start: 2018-05-29 | End: 2018-05-29 | Stop reason: HOSPADM

## 2018-05-29 RX ORDER — LIDOCAINE 40 MG/G
CREAM TOPICAL
Status: DISCONTINUED | OUTPATIENT
Start: 2018-05-29 | End: 2018-05-31 | Stop reason: HOSPADM

## 2018-05-29 RX ORDER — ACETAMINOPHEN 325 MG/1
975 TABLET ORAL EVERY 8 HOURS
Status: DISCONTINUED | OUTPATIENT
Start: 2018-05-29 | End: 2018-05-31 | Stop reason: HOSPADM

## 2018-05-29 RX ORDER — ONDANSETRON 2 MG/ML
4 INJECTION INTRAMUSCULAR; INTRAVENOUS EVERY 30 MIN PRN
Status: DISCONTINUED | OUTPATIENT
Start: 2018-05-29 | End: 2018-05-29 | Stop reason: HOSPADM

## 2018-05-29 RX ORDER — ACETAMINOPHEN 325 MG/1
650 TABLET ORAL EVERY 4 HOURS PRN
Status: DISCONTINUED | OUTPATIENT
Start: 2018-06-01 | End: 2018-05-31 | Stop reason: HOSPADM

## 2018-05-29 RX ORDER — AMOXICILLIN 250 MG
2 CAPSULE ORAL 2 TIMES DAILY
Status: DISCONTINUED | OUTPATIENT
Start: 2018-05-29 | End: 2018-05-31 | Stop reason: HOSPADM

## 2018-05-29 RX ORDER — LIDOCAINE HYDROCHLORIDE 10 MG/ML
INJECTION, SOLUTION INFILTRATION; PERINEURAL PRN
Status: DISCONTINUED | OUTPATIENT
Start: 2018-05-29 | End: 2018-05-29

## 2018-05-29 RX ORDER — LABETALOL HYDROCHLORIDE 5 MG/ML
10 INJECTION, SOLUTION INTRAVENOUS
Status: DISCONTINUED | OUTPATIENT
Start: 2018-05-29 | End: 2018-05-29 | Stop reason: HOSPADM

## 2018-05-29 RX ORDER — DEXAMETHASONE SODIUM PHOSPHATE 10 MG/ML
INJECTION INTRAMUSCULAR; INTRAVENOUS PRN
Status: DISCONTINUED | OUTPATIENT
Start: 2018-05-29 | End: 2018-05-29 | Stop reason: HOSPADM

## 2018-05-29 RX ORDER — HYDROMORPHONE HYDROCHLORIDE 1 MG/ML
.3-.5 INJECTION, SOLUTION INTRAMUSCULAR; INTRAVENOUS; SUBCUTANEOUS EVERY 5 MIN PRN
Status: DISCONTINUED | OUTPATIENT
Start: 2018-05-29 | End: 2018-05-29 | Stop reason: HOSPADM

## 2018-05-29 RX ORDER — ONDANSETRON 2 MG/ML
INJECTION INTRAMUSCULAR; INTRAVENOUS PRN
Status: DISCONTINUED | OUTPATIENT
Start: 2018-05-29 | End: 2018-05-29

## 2018-05-29 RX ORDER — MEPERIDINE HYDROCHLORIDE 50 MG/ML
12.5 INJECTION INTRAMUSCULAR; INTRAVENOUS; SUBCUTANEOUS EVERY 5 MIN PRN
Status: DISCONTINUED | OUTPATIENT
Start: 2018-05-29 | End: 2018-05-29 | Stop reason: HOSPADM

## 2018-05-29 RX ORDER — AMOXICILLIN 250 MG
1 CAPSULE ORAL 2 TIMES DAILY
Status: DISCONTINUED | OUTPATIENT
Start: 2018-05-29 | End: 2018-05-31 | Stop reason: HOSPADM

## 2018-05-29 RX ORDER — ALBUTEROL SULFATE 90 UG/1
1-2 AEROSOL, METERED RESPIRATORY (INHALATION) EVERY 4 HOURS PRN
Status: DISCONTINUED | OUTPATIENT
Start: 2018-05-29 | End: 2018-05-31 | Stop reason: HOSPADM

## 2018-05-29 RX ORDER — OXYCODONE HYDROCHLORIDE 5 MG/1
5-10 TABLET ORAL EVERY 4 HOURS PRN
Status: DISCONTINUED | OUTPATIENT
Start: 2018-05-29 | End: 2018-05-31 | Stop reason: HOSPADM

## 2018-05-29 RX ORDER — DIAZEPAM 10 MG/2ML
2.5 INJECTION, SOLUTION INTRAMUSCULAR; INTRAVENOUS
Status: DISCONTINUED | OUTPATIENT
Start: 2018-05-29 | End: 2018-05-29

## 2018-05-29 RX ORDER — DIMENHYDRINATE 50 MG/ML
25 INJECTION, SOLUTION INTRAMUSCULAR; INTRAVENOUS
Status: DISCONTINUED | OUTPATIENT
Start: 2018-05-29 | End: 2018-05-29 | Stop reason: HOSPADM

## 2018-05-29 RX ORDER — CEFAZOLIN SODIUM 1 G/50ML
1 INJECTION, SOLUTION INTRAVENOUS EVERY 8 HOURS
Status: COMPLETED | OUTPATIENT
Start: 2018-05-29 | End: 2018-05-30

## 2018-05-29 RX ORDER — DIMENHYDRINATE 50 MG/ML
25 INJECTION, SOLUTION INTRAMUSCULAR; INTRAVENOUS
Status: DISCONTINUED | OUTPATIENT
Start: 2018-05-29 | End: 2018-05-29

## 2018-05-29 RX ORDER — NEOSTIGMINE METHYLSULFATE 1 MG/ML
VIAL (ML) INJECTION PRN
Status: DISCONTINUED | OUTPATIENT
Start: 2018-05-29 | End: 2018-05-29

## 2018-05-29 RX ORDER — SODIUM CHLORIDE, SODIUM LACTATE, POTASSIUM CHLORIDE, CALCIUM CHLORIDE 600; 310; 30; 20 MG/100ML; MG/100ML; MG/100ML; MG/100ML
INJECTION, SOLUTION INTRAVENOUS CONTINUOUS PRN
Status: DISCONTINUED | OUTPATIENT
Start: 2018-05-29 | End: 2018-05-29

## 2018-05-29 RX ORDER — LOSARTAN POTASSIUM 100 MG/1
100 TABLET ORAL AT BEDTIME
Status: DISCONTINUED | OUTPATIENT
Start: 2018-05-29 | End: 2018-05-31 | Stop reason: HOSPADM

## 2018-05-29 RX ORDER — LIDOCAINE 40 MG/G
CREAM TOPICAL
Status: CANCELLED | OUTPATIENT
Start: 2018-05-29

## 2018-05-29 RX ORDER — ALBUTEROL SULFATE 0.83 MG/ML
2.5 SOLUTION RESPIRATORY (INHALATION) EVERY 4 HOURS PRN
Status: DISCONTINUED | OUTPATIENT
Start: 2018-05-29 | End: 2018-05-29 | Stop reason: HOSPADM

## 2018-05-29 RX ORDER — CEFAZOLIN SODIUM 2 G/100ML
2 INJECTION, SOLUTION INTRAVENOUS
Status: COMPLETED | OUTPATIENT
Start: 2018-05-29 | End: 2018-05-29

## 2018-05-29 RX ORDER — METHYLPREDNISOLONE ACETATE 80 MG/ML
INJECTION, SUSPENSION INTRA-ARTICULAR; INTRALESIONAL; INTRAMUSCULAR; SOFT TISSUE PRN
Status: DISCONTINUED | OUTPATIENT
Start: 2018-05-29 | End: 2018-05-29

## 2018-05-29 RX ORDER — HYDROXYZINE HYDROCHLORIDE 10 MG/1
10 TABLET, FILM COATED ORAL EVERY 6 HOURS PRN
Status: DISCONTINUED | OUTPATIENT
Start: 2018-05-29 | End: 2018-05-31 | Stop reason: HOSPADM

## 2018-05-29 RX ORDER — PROCHLORPERAZINE MALEATE 5 MG
5 TABLET ORAL EVERY 6 HOURS PRN
Status: DISCONTINUED | OUTPATIENT
Start: 2018-05-29 | End: 2018-05-31 | Stop reason: HOSPADM

## 2018-05-29 RX ORDER — DEXAMETHASONE SODIUM PHOSPHATE 4 MG/ML
INJECTION, SOLUTION INTRA-ARTICULAR; INTRALESIONAL; INTRAMUSCULAR; INTRAVENOUS; SOFT TISSUE PRN
Status: DISCONTINUED | OUTPATIENT
Start: 2018-05-29 | End: 2018-05-29

## 2018-05-29 RX ORDER — GLYCOPYRROLATE 0.2 MG/ML
INJECTION, SOLUTION INTRAMUSCULAR; INTRAVENOUS PRN
Status: DISCONTINUED | OUTPATIENT
Start: 2018-05-29 | End: 2018-05-29

## 2018-05-29 RX ORDER — CEFAZOLIN SODIUM 1 G/3ML
1 INJECTION, POWDER, FOR SOLUTION INTRAMUSCULAR; INTRAVENOUS SEE ADMIN INSTRUCTIONS
Status: DISCONTINUED | OUTPATIENT
Start: 2018-05-29 | End: 2018-05-29 | Stop reason: HOSPADM

## 2018-05-29 RX ORDER — CARVEDILOL 12.5 MG/1
12.5 TABLET ORAL 2 TIMES DAILY
Status: DISCONTINUED | OUTPATIENT
Start: 2018-05-29 | End: 2018-05-31 | Stop reason: HOSPADM

## 2018-05-29 RX ORDER — NALOXONE HYDROCHLORIDE 0.4 MG/ML
.1-.4 INJECTION, SOLUTION INTRAMUSCULAR; INTRAVENOUS; SUBCUTANEOUS
Status: DISCONTINUED | OUTPATIENT
Start: 2018-05-29 | End: 2018-05-31 | Stop reason: HOSPADM

## 2018-05-29 RX ORDER — FENTANYL CITRATE 50 UG/ML
INJECTION, SOLUTION INTRAMUSCULAR; INTRAVENOUS PRN
Status: DISCONTINUED | OUTPATIENT
Start: 2018-05-29 | End: 2018-05-29

## 2018-05-29 RX ORDER — BUPIVACAINE HYDROCHLORIDE AND EPINEPHRINE 5; 5 MG/ML; UG/ML
INJECTION, SOLUTION PERINEURAL PRN
Status: DISCONTINUED | OUTPATIENT
Start: 2018-05-29 | End: 2018-05-29

## 2018-05-29 RX ORDER — BUPIVACAINE HYDROCHLORIDE AND EPINEPHRINE 5; 5 MG/ML; UG/ML
INJECTION, SOLUTION EPIDURAL; INTRACAUDAL; PERINEURAL PRN
Status: DISCONTINUED | OUTPATIENT
Start: 2018-05-29 | End: 2018-05-29 | Stop reason: HOSPADM

## 2018-05-29 RX ORDER — DEXTROSE MONOHYDRATE, SODIUM CHLORIDE, AND POTASSIUM CHLORIDE 50; 1.49; 4.5 G/1000ML; G/1000ML; G/1000ML
INJECTION, SOLUTION INTRAVENOUS CONTINUOUS
Status: DISCONTINUED | OUTPATIENT
Start: 2018-05-29 | End: 2018-05-30

## 2018-05-29 RX ORDER — ONDANSETRON 2 MG/ML
4 INJECTION INTRAMUSCULAR; INTRAVENOUS EVERY 6 HOURS PRN
Status: DISCONTINUED | OUTPATIENT
Start: 2018-05-29 | End: 2018-05-31 | Stop reason: HOSPADM

## 2018-05-29 RX ORDER — ONDANSETRON 4 MG/1
4 TABLET, ORALLY DISINTEGRATING ORAL EVERY 6 HOURS PRN
Status: DISCONTINUED | OUTPATIENT
Start: 2018-05-29 | End: 2018-05-31 | Stop reason: HOSPADM

## 2018-05-29 RX ORDER — DIAZEPAM 10 MG/2ML
2.5 INJECTION, SOLUTION INTRAMUSCULAR; INTRAVENOUS
Status: DISCONTINUED | OUTPATIENT
Start: 2018-05-29 | End: 2018-05-29 | Stop reason: HOSPADM

## 2018-05-29 RX ORDER — PROPOFOL 10 MG/ML
INJECTION, EMULSION INTRAVENOUS PRN
Status: DISCONTINUED | OUTPATIENT
Start: 2018-05-29 | End: 2018-05-29

## 2018-05-29 RX ORDER — METOCLOPRAMIDE HYDROCHLORIDE 5 MG/ML
5 INJECTION INTRAMUSCULAR; INTRAVENOUS EVERY 6 HOURS PRN
Status: DISCONTINUED | OUTPATIENT
Start: 2018-05-29 | End: 2018-05-31 | Stop reason: HOSPADM

## 2018-05-29 RX ADMIN — METHYLPREDNISOLONE ACETATE 80 MG: 80 INJECTION, SUSPENSION INTRA-ARTICULAR; INTRALESIONAL; INTRAMUSCULAR; SOFT TISSUE at 11:28

## 2018-05-29 RX ADMIN — GLYCOPYRROLATE 0.3 MG: 0.2 INJECTION, SOLUTION INTRAMUSCULAR; INTRAVENOUS at 14:05

## 2018-05-29 RX ADMIN — CEFAZOLIN SODIUM 1 G: 1 INJECTION, SOLUTION INTRAVENOUS at 20:58

## 2018-05-29 RX ADMIN — Medication 1 G: at 14:02

## 2018-05-29 RX ADMIN — LIDOCAINE HYDROCHLORIDE 30 MG: 10 INJECTION, SOLUTION INFILTRATION; PERINEURAL at 12:08

## 2018-05-29 RX ADMIN — APIXABAN 5 MG: 2.5 TABLET, FILM COATED ORAL at 21:52

## 2018-05-29 RX ADMIN — FENTANYL CITRATE 100 MCG: 50 INJECTION, SOLUTION INTRAMUSCULAR; INTRAVENOUS at 11:28

## 2018-05-29 RX ADMIN — OMEPRAZOLE 40 MG: 20 CAPSULE, DELAYED RELEASE ORAL at 17:05

## 2018-05-29 RX ADMIN — POTASSIUM CHLORIDE, DEXTROSE MONOHYDRATE AND SODIUM CHLORIDE: 150; 5; 450 INJECTION, SOLUTION INTRAVENOUS at 18:05

## 2018-05-29 RX ADMIN — TRAMADOL HYDROCHLORIDE 50 MG: 50 TABLET, FILM COATED ORAL at 18:05

## 2018-05-29 RX ADMIN — Medication 1 G: at 12:10

## 2018-05-29 RX ADMIN — GLYCOPYRROLATE 0.2 MG: 0.2 INJECTION, SOLUTION INTRAMUSCULAR; INTRAVENOUS at 12:08

## 2018-05-29 RX ADMIN — DEXAMETHASONE SODIUM PHOSPHATE 8 MG: 4 INJECTION, SOLUTION INTRA-ARTICULAR; INTRALESIONAL; INTRAMUSCULAR; INTRAVENOUS; SOFT TISSUE at 12:08

## 2018-05-29 RX ADMIN — LOSARTAN POTASSIUM 100 MG: 100 TABLET ORAL at 21:52

## 2018-05-29 RX ADMIN — SENNOSIDES AND DOCUSATE SODIUM 1 TABLET: 8.6; 5 TABLET ORAL at 21:52

## 2018-05-29 RX ADMIN — ACETAMINOPHEN 975 MG: 325 TABLET, FILM COATED ORAL at 17:06

## 2018-05-29 RX ADMIN — SODIUM CHLORIDE, POTASSIUM CHLORIDE, SODIUM LACTATE AND CALCIUM CHLORIDE: 600; 310; 30; 20 INJECTION, SOLUTION INTRAVENOUS at 12:02

## 2018-05-29 RX ADMIN — FENTANYL CITRATE 250 MCG: 50 INJECTION, SOLUTION INTRAMUSCULAR; INTRAVENOUS at 12:08

## 2018-05-29 RX ADMIN — ONDANSETRON 4 MG: 2 INJECTION INTRAMUSCULAR; INTRAVENOUS at 12:08

## 2018-05-29 RX ADMIN — PROPOFOL 200 MG: 10 INJECTION, EMULSION INTRAVENOUS at 12:08

## 2018-05-29 RX ADMIN — MIDAZOLAM 2 MG: 1 INJECTION INTRAMUSCULAR; INTRAVENOUS at 11:28

## 2018-05-29 RX ADMIN — CEFAZOLIN SODIUM 2 G: 2 INJECTION, SOLUTION INTRAVENOUS at 12:02

## 2018-05-29 RX ADMIN — BUPIVACAINE HYDROCHLORIDE AND EPINEPHRINE BITARTRATE 40 ML: 5; .005 INJECTION, SOLUTION EPIDURAL; INTRACAUDAL; PERINEURAL at 11:28

## 2018-05-29 RX ADMIN — Medication 3 MG: at 14:05

## 2018-05-29 RX ADMIN — KETOROLAC TROMETHAMINE 30 MG: 30 INJECTION, SOLUTION INTRAMUSCULAR at 12:08

## 2018-05-29 RX ADMIN — SODIUM CHLORIDE, POTASSIUM CHLORIDE, SODIUM LACTATE AND CALCIUM CHLORIDE: 600; 310; 30; 20 INJECTION, SOLUTION INTRAVENOUS at 13:31

## 2018-05-29 ASSESSMENT — NEW YORK HEART ASSOCIATION (NYHA) CLASSIFICATION: NYHA FUNCTIONAL CLASS: I

## 2018-05-29 ASSESSMENT — ENCOUNTER SYMPTOMS: DYSRHYTHMIAS: 1

## 2018-05-29 NOTE — ANESTHESIA POSTPROCEDURE EVALUATION
Patient: Suzi Amaya    Procedure(s):  Revision Left Total knee Arthroplasty    - Wound Class: I-Clean    Diagnosis:Failed Total knee   Diagnosis Additional Information: Failing TKA  Dr. Ennis    Anesthesia Type:  General, LMA, Periph. Nerve Block for postop pain    Note:  Anesthesia Post Evaluation    Patient location during evaluation: PACU  Patient participation: Able to participate in evaluation but full recovery from regional anesthesia has not yet ocurrred but is anticipated to occur within 48 hours  Level of consciousness: awake  Pain management: adequate  Airway patency: patent  Cardiovascular status: acceptable  Respiratory status: acceptable  Hydration status: acceptable  PONV: none             Last vitals:  Vitals:    05/29/18 1035 05/29/18 1047   BP: 169/80 151/82   Pulse: 66    Resp: 20    Temp: 97.7  F (36.5  C)    SpO2: 98%          Electronically Signed By: Fab Lindo MD  May 29, 2018  2:44 PM

## 2018-05-29 NOTE — ANESTHESIA CARE TRANSFER NOTE
Patient: Suzi Amaya    Procedure(s):  Revision Left Total knee Arthroplasty    - Wound Class: I-Clean    Diagnosis: Failed Total knee   Diagnosis Additional Information: No value filed.    Anesthesia Type:   General, LMA, Periph. Nerve Block for postop pain     Note:  Airway :Face Mask  Patient transferred to:PACU  Comments: To PACU, adequate gas exchange, report to RN.Handoff Report: Identifed the Patient, Identified the Reponsible Provider, Reviewed the pertinent medical history, Discussed the surgical course, Reviewed Intra-OP anesthesia mangement and issues during anesthesia, Set expectations for post-procedure period and Allowed opportunity for questions and acknowledgement of understanding      Vitals: (Last set prior to Anesthesia Care Transfer)    CRNA VITALS  5/29/2018 1401 - 5/29/2018 1431      5/29/2018             EKG: Sinus rhythm                Electronically Signed By: SERGIO Rodriguez CRNA  May 29, 2018  2:31 PM

## 2018-05-29 NOTE — IP AVS SNAPSHOT
MRN:[id number]                      After Visit Summary   5/29/2018    Suzi Amaya    MRN: [id number]           Thank you!     Thank you for choosing [facility] [place] for your care. Our goal is always to provide you with excellent care. Hearing back from our patients is one way we can continue to improve our services. Please take a few minutes to complete the written survey that you may receive in the mail after you visit. If you would like to speak to someone directly about your visit please contact Patient Relations at [phone]. Thank you!          Patient Information     Date Of Birth          1940        Designated Caregiver       Most Recent Value    Caregiver    Will someone help with your care after discharge? yes    Name of designated caregiver Jose Alejandro-    Phone number of caregiver [phone]    Caregiver address [facility]      About your hospital stay     You were admitted on:  May 29, 2018 You last received care in the:  [facility]    You were discharged on:  May 31, 2018        Reason for your hospital stay       Revision tka                  Who to Call     For medical emergencies, please call 911.  For non-urgent questions about your medical care, please call your primary care provider or clinic, [phone]  For questions related to your surgery, please call your surgery clinic        Attending Provider     Provider Specialty    Fab Ennis MD Orthopaedic Surgery       Primary Care Provider Office Phone # Fax #    [facility] [phone]      After Care Instructions     Activity       Your activity upon discharge: activity as tolerated with caution/walker.            Continuous Passive Motion Machine           Diet       Follow this diet upon discharge: Regular            Wound care and dressings       Instructions to care for your wound at home: as directed.                  Follow-up Appointments     Follow-up and  "recommended labs and tests        Follow up with Dr. newman , at (location with clinic name or city) [facility], within 12-14 days  to evaluate after surgery. No follow up labs or test are needed.                  Additional Services     Physical Therapy Referral       *This therapy referral will be filtered to a centralized scheduling office at [facility] and the patient will receive a call to schedule an appointment at a [place] location most convenient for them. *     [facility] provides Physical Therapy evaluation and treatment and many specialty services across the [place] system.  If requesting a specialty program, please choose from the list below.    If you have not heard from the scheduling office within 2 business days, please call [phone] for all locations, with the exception of [place], please call [phone] and [facility], please call [phone]  Treatment: Evaluation & Treatment  Special Instructions/Modalities: none  Special Programs: None    Please be aware that coverage of these services is subject to the terms and limitations of your health insurance plan.  Call member services at your health plan with any benefit or coverage questions.      **Note to Provider:  If you are referring outside of [facility] for the therapy appointment, please list the name of the location in the \"special instructions\" above, print the referral and give to the patient to schedule the appointment.                  Further instructions from your care team       Return to clinic in 10-14 days.  Call [phone] to schedule or if you experience any problems before your scheduled appointment.      See general discharge instruction sheet for knees  1. Do exercises at home as instructed by therapist twice a day. Continue outpatient therapy as ordered by your doctor.  2. Ice knee after exercises and therapy.  3. Examine dressing daily for problems.  4. May shower, can get " dressing wet, no soaking (no bathtubs, pools or hot tubs)  5. Notify your dentist or physician of your implant so you can get antibiotics before any dental work or before any invasive procedure (ie: colonoscopy)  6. Remove anti-embolism stockings (Jhony hose) for 30 minutes twice daily.      Aquacel dressing:  DO NOT CHANGE DRESSING DAILY.  Leave this dressing on until follow-up appointment with Orthopedic Surgeon.  Dressing is waterproof, can shower with it on, pat dry when done.  No soaking such as in tub baths, pools or hot tubs        While on narcotic pain medication, to prevent constipation:  1. Drink plenty of water to keep well hydrated   2. May take over the counter Colace or Senna (follow instructions on label)        Call your physician if: ([phone])   1. Persistent fever greater than 100 degrees with body chills or excessive sweating.  2. Increased/persistent redness, localized warmth, tenderness, drainage or swelling at incision site. Greater than 50% drainage on dressing.   3. Persistent pain not controlled with oral pain medications, ice and rest.   4. No bowel movement in 3 days (may use Milk of Magnesia or other over the counter remedy).  5. Chest pain, shortness of breath, and/or calf pain with excessive swelling.  6. Generalized feeling of illness.  7. Any other questions or concerns related to your surgery/recovery.    Thank you for allowing [facility] to participate in your cares!!          Pending Results     No orders found from 5/27/2018 to 5/30/2018.            Statement of Approval     Ordered          05/30/18 1110  I have reviewed and agree with all the recommendations and orders detailed in this document.  EFFECTIVE NOW     Approved and electronically signed by:  Jc Pugh PA-C             Admission Information     Date & Time Provider Department Dept. Phone    5/29/2018 Fab Ennis MD [facility] Ortho Spine [phone]      Your Vitals Were      "Blood Pressure Pulse Temperature Respirations Height Weight    135/56 (BP Location: Right arm) 66 97.3  F (36.3  C) (Oral) 16 1.575 m (5' 2\") 64.4 kg (142 lb)    Pulse Oximetry BMI (Body Mass Index)                95% 25.97 kg/m2          Telik Information     Telik lets you send messages to your doctor, view your test results, renew your prescriptions, schedule appointments and more. To sign up, go to [email or web address] . Click on \"Log in\" on the left side of the screen, which will take you to the Welcome page. Then click on \"Sign up Now\" on the right side of the page.     You will be asked to enter the access code listed below, as well as some personal information. Please follow the directions to create your username and password.     Your access code is: [email or web address]  Expires: 2018  9:48 AM     Your access code will  in 90 days. If you need help or a new code, please call your [facility] clinic or [phone].        Care EveryWhere ID     This is your Care EveryWhere ID. This could be used by other organizations to access your [facility] medical records  [id number]        Equal Access to Services     RACHELLE BELLAMY AH: Chaim hennessyo Suzan, waaxda luqadaha, qaybta kaalmada adeegyada, rogerio serrano. So [facility] [phone].    ATENCIÓN: Si habla español, tiene a king disposición servicios gratuitos de asistencia lingüística. Llame al [phone].    We comply with applicable federal civil rights laws and [place] laws. We do not discriminate on the basis of race, color, national origin, age, disability, sex, sexual orientation, or gender identity.               Review of your medicines      START taking        Dose / Directions    COMPRESSION STOCKINGS   Used for:  Status post revision of total knee replacement, unspecified laterality        Dose:  1 each   1 each continuous   Quantity:  1 each   Refills:  0       traMADol 50 MG tablet   Commonly known as:  ULTRAM "   Used for:  Status post revision of total knee replacement, unspecified laterality        Dose:  50 mg   Take 1 tablet (50 mg) by mouth every 6 hours as needed for severe pain   Quantity:  60 tablet   Refills:  0         CONTINUE these medicines which have NOT CHANGED        Dose / Directions    acetaminophen 325 MG tablet   Commonly known as:  TYLENOL   Used for:  Hx of total knee arthroplasty, left        Dose:  325 mg   Take 1 tablet (325 mg) by mouth every 4 hours as needed for mild pain or fever   Quantity:  100 tablet   Refills:  0       albuterol 108 (90 Base) MCG/ACT Inhaler   Commonly known as:  PROAIR HFA/PROVENTIL HFA/VENTOLIN HFA   Notes to Patient:  Resume per home schedule.        Dose:  1-2 puff   Inhale 1-2 puffs into the lungs every 4 hours as needed for shortness of breath / dyspnea or wheezing   Refills:  0       ALLERGY INJECTIONS   Notes to Patient:  Resume per home schedule.        Dose:  1 Dose   Inject 1 Dose as directed every 30 days Given at [facility]   Refills:  0       B-12 PO   Notes to Patient:  Resume per home schedule.         Dose:  1 tablet   Take 1 tablet by mouth daily   Refills:  0       butalbital-aspirin-caffeine -40 MG per capsule   Commonly known as:  FIORINAL   Notes to Patient:  Resume per home schedule.         Dose:  1 capsule   Take 1 capsule by mouth 2 times daily as needed for headaches   Refills:  0       CARVEDILOL PO        Dose:  12.5 mg   Take 12.5 mg by mouth 2 times daily   Refills:  0       ELIQUIS PO        Dose:  5 mg   Take 5 mg by mouth 2 times daily   Refills:  0       LOSARTAN POTASSIUM PO        Dose:  100 mg   Take 100 mg by mouth At Bedtime   Refills:  0       multivitamin, therapeutic with minerals Tabs tablet   Notes to Patient:  Resume per home schedule.         Dose:  1 tablet   Take 1 tablet by mouth daily   Refills:  0       OMEPRAZOLE PO        Dose:  40 mg   Take 40 mg by mouth daily (with dinner)   Refills:  0       ondansetron  4 MG ODT tab   Commonly known as:  ZOFRAN-ODT   Used for:  Hx of total knee arthroplasty, left   Notes to Patient:  Resume per home schedule.         Dose:  4 mg   Take 1 tablet (4 mg) by mouth every 6 hours as needed for nausea or vomiting   Quantity:  20 tablet   Refills:  0       senna-docusate 8.6-50 MG per tablet   Commonly known as:  SENOKOT-S;PERICOLACE   Used for:  Hx of total knee arthroplasty, left        Dose:  1-2 tablet   Take 1-2 tablets by mouth 2 times daily   Quantity:  50 tablet   Refills:  0       triamterene-hydrochlorothiazide 37.5-25 MG per tablet   Commonly known as:  MAXZIDE-25   Notes to Patient:  Resume per home schedule.         Dose:  1 tablet   Take 1 tablet by mouth every morning   Refills:  0       VITAMIN D (CHOLECALCIFEROL) PO   Notes to Patient:  Resume per home schedule.         Dose:  1 tablet   Take 1 tablet by mouth daily (with dinner)   Refills:  0            Where to get your medicines      Some of these will need a paper prescription and others can be bought over the counter. Ask your nurse if you have questions.     Bring a paper prescription for each of these medications     traMADol 50 MG tablet       You don't need a prescription for these medications     COMPRESSION STOCKINGS                Protect others around you: Learn how to safely use, store and throw away your medicines at www.disposemymeds.org.        Information about OPIOIDS     PRESCRIPTION OPIOIDS: WHAT YOU NEED TO KNOW   You have a prescription for an opioid (narcotic) pain medicine. Opioids can cause addiction. If you have a history of chemical dependency of any type, you are at a higher risk of becoming addicted to opioids. Only take this medicine after all other options have been tried. Take it for as short a time and as few doses as possible.     Do not:    Drive. If you drive while taking these medicines, you could be arrested for driving under the influence (DUI).    Operate heavy machinery    Do any  other dangerous activities while taking these medicines.     Drink any alcohol while taking these medicines.      Take with any other medicines that contain acetaminophen. Read all labels carefully. Look for the word  acetaminophen  or  Tylenol.  Ask your pharmacist if you have questions or are unsure.    Store your pills in a secure place, locked if possible. We will not replace any lost or stolen medicine. If you don t finish your medicine, please throw away (dispose) as directed by your pharmacist. The [place] Pollution Control Agency has more information about safe disposal: https://www.pca.[facility].[place].us/living-green/managing-unwanted-medications    All opioids tend to cause constipation. Drink plenty of water and eat foods that have a lot of fiber, such as fruits, vegetables, prune juice, apple juice and high-fiber cereal. Take a laxative (Miralax, milk of magnesia, Colace, Senna) if you don t move your bowels at least every other day.              Medication List: This is a list of all your medications and when to take them. Check marks below indicate your daily home schedule. Keep this list as a reference.      Medications           Morning Afternoon Evening Bedtime As Needed    acetaminophen 325 MG tablet   Commonly known as:  TYLENOL   Take 1 tablet (325 mg) by mouth every 4 hours as needed for mild pain or fever   Last time this was given:  975 mg on 5/31/2018  9:24 AM   Next Dose Due:  5:30 PM TODAY                                   albuterol 108 (90 Base) MCG/ACT Inhaler   Commonly known as:  PROAIR HFA/PROVENTIL HFA/VENTOLIN HFA   Inhale 1-2 puffs into the lungs every 4 hours as needed for shortness of breath / dyspnea or wheezing   Notes to Patient:  Resume per home schedule.                                   ALLERGY INJECTIONS   Inject 1 Dose as directed every 30 days Given at [facility]   Notes to Patient:  Resume per home schedule.                                   B-12 PO   Take 1 tablet by  mouth daily   Notes to Patient:  Resume per home schedule.                                 butalbital-aspirin-caffeine -40 MG per capsule   Commonly known as:  FIORINAL   Take 1 capsule by mouth 2 times daily as needed for headaches   Notes to Patient:  Resume per home schedule.                                    CARVEDILOL PO   Take 12.5 mg by mouth 2 times daily   Last time this was given:  12.5 mg on 5/31/2018  9:24 AM   Next Dose Due:  THIS EVENING. 5/31.                                      COMPRESSION STOCKINGS   1 each continuous                                ELIQUIS PO   Take 5 mg by mouth 2 times daily   Last time this was given:  5 mg on 5/31/2018  9:25 AM   Next Dose Due:  THIS EVENING. 5/31.                                      LOSARTAN POTASSIUM PO   Take 100 mg by mouth At Bedtime   Last time this was given:  100 mg on 5/30/2018  9:22 PM   Next Dose Due:  TONIGHT. 5/31.                                   multivitamin, therapeutic with minerals Tabs tablet   Take 1 tablet by mouth daily   Notes to Patient:  Resume per home schedule.                                 OMEPRAZOLE PO   Take 40 mg by mouth daily (with dinner)   Last time this was given:  40 mg on 5/30/2018  3:51 PM   Next Dose Due:  THIS EVENING. 5/31.                                   ondansetron 4 MG ODT tab   Commonly known as:  ZOFRAN-ODT   Take 1 tablet (4 mg) by mouth every 6 hours as needed for nausea or vomiting   Notes to Patient:  Resume per home schedule.                                    senna-docusate 8.6-50 MG per tablet   Commonly known as:  SENOKOT-S;PERICOLACE   Take 1-2 tablets by mouth 2 times daily   Last time this was given:  2 tablets on 5/31/2018  9:24 AM   Next Dose Due:  THIS EVENING. 5/31.                                      traMADol 50 MG tablet   Commonly known as:  ULTRAM   Take 1 tablet (50 mg) by mouth every 6 hours as needed for severe pain   Last time this was given:  50 mg on 5/31/2018  5:14 AM                                    triamterene-hydrochlorothiazide 37.5-25 MG per tablet   Commonly known as:  MAXZIDE-25   Take 1 tablet by mouth every morning   Notes to Patient:  Resume per home schedule.                                 VITAMIN D (CHOLECALCIFEROL) PO   Take 1 tablet by mouth daily (with dinner)   Notes to Patient:  Resume per home schedule.

## 2018-05-29 NOTE — ANESTHESIA PREPROCEDURE EVALUATION
Anesthesia Evaluation     . Pt has had prior anesthetic. Type: General    No history of anesthetic complications          ROS/MED HX    ENT/Pulmonary:     (+)Intermittent asthma Treatment: Inhaler prn,  , . .    Neurologic:  - neg neurologic ROS     Cardiovascular:     (+) hypertension----. : . CHF etiology: cardiomyopathy Last EF: 55 NYHA classification: I. . :. dysrhythmias a-fib, Irregular Heartbeat/Palpitations, .       METS/Exercise Tolerance:     Hematologic:  - neg hematologic  ROS       Musculoskeletal:   (+) arthritis, , , -       GI/Hepatic:     (+) GERD Asymptomatic on medication,       Renal/Genitourinary:  - ROS Renal section negative       Endo:  - neg endo ROS       Psychiatric:  - neg psychiatric ROS       Infectious Disease:  - neg infectious disease ROS       Malignancy:      - no malignancy   Other:    - neg other ROS                 Physical Exam  Normal systems: cardiovascular, pulmonary and dental    Airway   Mallampati: II  TM distance: >3 FB  Neck ROM: full    Dental     Cardiovascular   Rhythm and rate: regular and normal      Pulmonary    breath sounds clear to auscultation    Other findings: ECG  LBBB otherwise normal sinus rhythm                Anesthesia Plan      History & Physical Review  History and physical reviewed and following examination; no interval change.    ASA Status:  2 .    NPO Status:  > 8 hours    Plan for General, LMA and Periph. Nerve Block for postop pain with Intravenous induction. Maintenance will be Balanced.    PONV prophylaxis:  Ondansetron (or other 5HT-3) and Dexamethasone or Solumedrol       Postoperative Care  Postoperative pain management:  IV analgesics, Oral pain medications, Multi-modal analgesia and Peripheral nerve block (Single Shot).      Consents  Anesthetic plan, risks, benefits and alternatives discussed with:  Patient.  Use of blood products discussed: No .   .                          .

## 2018-05-29 NOTE — IP AVS SNAPSHOT
[facility]    [address]    [facility] [address]    Phone:  [phone]    Fax:  [phone]                                       After Visit Summary   5/29/2018    Suzi Amaya    MRN: [id number]           After Visit Summary Signature Page     I have received my discharge instructions, and my questions have been answered. I have discussed any challenges I see with this plan with the nurse or doctor.    ..........................................................................................................................................  Patient/Patient Representative Signature      ..........................................................................................................................................  Patient Representative Print Name and Relationship to Patient    ..................................................               ................................................  Date                                            Time    ..........................................................................................................................................  Reviewed by Signature/Title    ...................................................              ..............................................  Date                                                            Time

## 2018-05-30 ENCOUNTER — APPOINTMENT (OUTPATIENT)
Dept: PHYSICAL THERAPY | Facility: CLINIC | Age: 78
DRG: 467 | End: 2018-05-30
Attending: ORTHOPAEDIC SURGERY
Payer: MEDICARE

## 2018-05-30 LAB
GLUCOSE SERPL-MCNC: 150 MG/DL (ref 70–99)
HGB BLD-MCNC: 10.2 G/DL (ref 11.7–15.7)
INR PPP: 1.29 (ref 0.86–1.14)

## 2018-05-30 PROCEDURE — 82947 ASSAY GLUCOSE BLOOD QUANT: CPT | Performed by: ORTHOPAEDIC SURGERY

## 2018-05-30 PROCEDURE — 97161 PT EVAL LOW COMPLEX 20 MIN: CPT | Mod: GP | Performed by: PHYSICAL THERAPIST

## 2018-05-30 PROCEDURE — A9270 NON-COVERED ITEM OR SERVICE: HCPCS | Mod: GY | Performed by: PHYSICIAN ASSISTANT

## 2018-05-30 PROCEDURE — 40000193 ZZH STATISTIC PT WARD VISIT: Performed by: PHYSICAL THERAPIST

## 2018-05-30 PROCEDURE — 97110 THERAPEUTIC EXERCISES: CPT | Mod: GP | Performed by: PHYSICAL THERAPIST

## 2018-05-30 PROCEDURE — A9270 NON-COVERED ITEM OR SERVICE: HCPCS | Mod: GY | Performed by: ORTHOPAEDIC SURGERY

## 2018-05-30 PROCEDURE — 25000132 ZZH RX MED GY IP 250 OP 250 PS 637: Mod: GY | Performed by: ORTHOPAEDIC SURGERY

## 2018-05-30 PROCEDURE — 25000128 H RX IP 250 OP 636: Performed by: ORTHOPAEDIC SURGERY

## 2018-05-30 PROCEDURE — 97116 GAIT TRAINING THERAPY: CPT | Mod: GP | Performed by: PHYSICAL THERAPIST

## 2018-05-30 PROCEDURE — 97530 THERAPEUTIC ACTIVITIES: CPT | Mod: GP | Performed by: PHYSICAL THERAPIST

## 2018-05-30 PROCEDURE — 97110 THERAPEUTIC EXERCISES: CPT | Mod: GP | Performed by: PHYSICAL THERAPY ASSISTANT

## 2018-05-30 PROCEDURE — 25000132 ZZH RX MED GY IP 250 OP 250 PS 637: Mod: GY | Performed by: PHYSICIAN ASSISTANT

## 2018-05-30 PROCEDURE — 99222 1ST HOSP IP/OBS MODERATE 55: CPT | Performed by: INTERNAL MEDICINE

## 2018-05-30 PROCEDURE — 85018 HEMOGLOBIN: CPT | Performed by: ORTHOPAEDIC SURGERY

## 2018-05-30 PROCEDURE — 36415 COLL VENOUS BLD VENIPUNCTURE: CPT | Performed by: ORTHOPAEDIC SURGERY

## 2018-05-30 PROCEDURE — 40000193 ZZH STATISTIC PT WARD VISIT: Performed by: PHYSICAL THERAPY ASSISTANT

## 2018-05-30 PROCEDURE — 12000000 ZZH R&B MED SURG/OB

## 2018-05-30 PROCEDURE — 97116 GAIT TRAINING THERAPY: CPT | Mod: GP | Performed by: PHYSICAL THERAPY ASSISTANT

## 2018-05-30 PROCEDURE — 85610 PROTHROMBIN TIME: CPT | Performed by: ORTHOPAEDIC SURGERY

## 2018-05-30 PROCEDURE — 99207 ZZC CONSULT E&M CHANGED TO INITIAL LEVEL: CPT | Performed by: INTERNAL MEDICINE

## 2018-05-30 RX ORDER — TRAMADOL HYDROCHLORIDE 50 MG/1
50 TABLET ORAL EVERY 6 HOURS PRN
Qty: 60 TABLET | Refills: 0 | Status: SHIPPED | OUTPATIENT
Start: 2018-05-30

## 2018-05-30 RX ADMIN — TRAMADOL HYDROCHLORIDE 50 MG: 50 TABLET, FILM COATED ORAL at 14:57

## 2018-05-30 RX ADMIN — OMEPRAZOLE 40 MG: 20 CAPSULE, DELAYED RELEASE ORAL at 15:51

## 2018-05-30 RX ADMIN — SENNOSIDES AND DOCUSATE SODIUM 2 TABLET: 8.6; 5 TABLET ORAL at 21:22

## 2018-05-30 RX ADMIN — SENNOSIDES AND DOCUSATE SODIUM 1 TABLET: 8.6; 5 TABLET ORAL at 08:39

## 2018-05-30 RX ADMIN — ACETAMINOPHEN 975 MG: 325 TABLET, FILM COATED ORAL at 08:38

## 2018-05-30 RX ADMIN — TRAMADOL HYDROCHLORIDE 50 MG: 50 TABLET, FILM COATED ORAL at 03:05

## 2018-05-30 RX ADMIN — APIXABAN 5 MG: 2.5 TABLET, FILM COATED ORAL at 08:39

## 2018-05-30 RX ADMIN — LOSARTAN POTASSIUM 100 MG: 100 TABLET ORAL at 21:22

## 2018-05-30 RX ADMIN — TRAMADOL HYDROCHLORIDE 50 MG: 50 TABLET, FILM COATED ORAL at 08:56

## 2018-05-30 RX ADMIN — ACETAMINOPHEN 975 MG: 325 TABLET, FILM COATED ORAL at 00:04

## 2018-05-30 RX ADMIN — ACETAMINOPHEN 975 MG: 325 TABLET, FILM COATED ORAL at 15:51

## 2018-05-30 RX ADMIN — CARVEDILOL 12.5 MG: 12.5 TABLET, FILM COATED ORAL at 08:38

## 2018-05-30 RX ADMIN — APIXABAN 5 MG: 2.5 TABLET, FILM COATED ORAL at 21:22

## 2018-05-30 RX ADMIN — CEFAZOLIN SODIUM 1 G: 1 INJECTION, SOLUTION INTRAVENOUS at 04:17

## 2018-05-30 RX ADMIN — TRAMADOL HYDROCHLORIDE 50 MG: 50 TABLET, FILM COATED ORAL at 21:22

## 2018-05-30 RX ADMIN — CARVEDILOL 12.5 MG: 12.5 TABLET, FILM COATED ORAL at 21:23

## 2018-05-30 NOTE — PLAN OF CARE
Problem: Patient Care Overview  Goal: Plan of Care/Patient Progress Review    Discharge Planner PT   Patient plan for discharge: Home tomorrow  Current status: Pt amb 100' with ww with supervision with step thru gait pattern. Pt performed 8 steps with 1 rail and SEC with step gait pattern with SBA . Vcs for first 4 steps to performed step to vs reciprocal. Pt is indep with all transfers with vcs for hand placement for sit to/from stand.   Barriers to return to prior living situation: Stairs to enter home and within home  Recommendations for discharge: Home  Rationale for recommendations: Anticipate that with continued IPPT the pt will be able to complete all mobility skills required for safe discharge home.        Entered by: Suad Nieves 05/30/2018 2:32 PM

## 2018-05-30 NOTE — PROGRESS NOTES
"[facility] Orthopedic Post-Op Note    Suzi Amaya MRN# [id number]   YOB: 1940 Age: 77 year old              Subjective:  Feels great, no pain.          Physical Exam:  Blood pressure 127/60, pulse 66, temperature 96.4  F (35.8  C), temperature source Oral, resp. rate 16, height 1.575 m (5' 2\"), weight 64.4 kg (142 lb), SpO2 92 %.  Wound clean and dry with minimal or no drainage.  Surrounding skin with minimal erythema.  Dressing dry and intact.    CMS is intact.    Calves non-tender bilaterally.          Data:  CBC:    Recent Labs  Lab 05/30/18  0706   HGB 10.2*     INR:  Recent Labs  Lab 05/30/18  0706 05/29/18  1649   INR 1.29* 1.07             Assessment and Plan:   Stable, s/p revision tka  D/c home tomorrow.       Jc Pugh PA-C    "

## 2018-05-30 NOTE — PLAN OF CARE
Problem: Patient Care Overview  Goal: Plan of Care/Patient Progress Review  Outcome: Improving  A/O.  CMS intact. Dressing intact to left knee.  Pain well controlled with scheduled Tylenol and PRN Ultram.  Restarted on Eliquis this evening.  Unable to void, scan for 312.  Pt would like to try again in a few hours.  Encouraged fluids.  Up to commode with Ax2 et walker.  Tolerated well.  Tolerating clear liquids.  Plans to discharge home Thursday.  Will continue to monitor.

## 2018-05-30 NOTE — CONSULTS
[facility]  Hospitalist consult note  May 30, 2018  Name: Suzi Amaya    MRN: [id number]  YOB: 1940    Age: 77 year old  Date of admission: 5/29/2018  Primary care provider: Gilson Mccollum      Summary:  Patient is a pleasant 77-year-old female who we are asked to consult for postoperative medical management.  Past medical history significant for hypertension, paroxysmal atrial fibrillation, nonischemic cardiomyopathy suspected to be due to tachycardic cardiomyopathy with EF of 45-55%, left bundle branch block, and occasional migraines.    Problem list/Plan:  1. Status post left knee TKA revision: Doing well postoperatively.  Will defer pain control to orthopedics.  Already on Eliquis for history of paroxysmal atrial fibrillation.  2. Paroxysmal atrial fibrillation: Currently in normal sinus rhythm.  Will resume Eliquis as well as carvedilol for rate control.  3. Hypertension: Continue carvedilol and losartan  4. GERD: Continue omeprazole  5. History of nonischemic cardiomyopathy: Clinically compensated at this point in time.      All lab work and imaging data independently reviewed by myself    Prophylaxis;  Eliquis/Ambulation.     Code status: Full code    Discharge: Home per orthopedics    Requesting physician: Dr. Ennis   Reason for consultation: Postoperative medical management   HPI  Patient is a pleasant 77-year-old female who we are asked to consult for postoperative medical management.  Past medical history significant for hypertension, paroxysmal atrial fibrillation, nonischemic cardiomyopathy suspected to be due to tachycardic cardiomyopathy with EF of 45-55%, left bundle branch block, and occasional migraines.  She is seen postoperative day #1.  Pain currently under control.  Doing quite well so far and hopefully go home tomorrow.  At current, denies any chest pain, palpitations, or shortness of breath.       Past Medical History:     Past Medical  History:   Diagnosis Date     Arthritis     OA     Complication of anesthesia     nausea and vomitting from MS     Gastroesophageal reflux disease      Hyperlipidemia      Hypertension      Irregular heart beat     atrial fibrillation, PVCs     Left bundle branch block      Migraine      Nonischemic cardiomyopathy (H)      Sinus infection     recent, long-lasting, requiring prednisone and antibiotics     Uncomplicated asthma     increased with  allergies     Past Surgical History:     Past Surgical History:   Procedure Laterality Date     ARTHROPLASTY REVISION KNEE Left 12/18/2017    Procedure: ARTHROPLASTY REVISION KNEE;  LEFT TOTAL KNEE POLYETHYLENE EXCHANGE   ;  Surgeon: Blu Mitchell MD;  Location: [facility] OR     ENT SURGERY      T&A     GYN SURGERY      D&C, tubal ligation     ORTHOPEDIC SURGERY  2005    right knee scope, RTK(eventually)     ORTHOPEDIC SURGERY  2005    left total knee     Social History:     Social History   Substance Use Topics     Smoking status: Never Smoker     Smokeless tobacco: Never Used     Alcohol use Yes      Comment: 2 glasses of wine per week     Family History:  Family history reviewed. NO pertinent family history     Allergies:     Allergies   Allergen Reactions     Dilaudid [Hydromorphone] Nausea and Vomiting     Morphine Nausea and Vomiting     Simvastatin GI Disturbance     Xarelto [Rivaroxaban] Other (See Comments)     Profuse bleeding     Medications:     Prescriptions Prior to Admission   Medication Sig Dispense Refill Last Dose     acetaminophen (TYLENOL) 325 MG tablet Take 1 tablet (325 mg) by mouth every 4 hours as needed for mild pain or fever 100 tablet 0 Past Week at Unknown time     ALLERGY INJECTIONS Inject 1 Dose as directed every 30 days Given at [facility]   Past Month at Unknown time     Apixaban (ELIQUIS PO) Take 5 mg by mouth 2 times daily   5/26/2018 at Unknown time     butalbital-aspirin-caffeine (FIORINAL) -40 MG per capsule Take 1 capsule by  "mouth 2 times daily as needed for headaches    Past Month at Unknown time     CARVEDILOL PO Take 12.5 mg by mouth 2 times daily   5/29/2018 at Unknown time     Cyanocobalamin (B-12 PO) Take 1 tablet by mouth daily   5/28/2018 at Unknown time     LOSARTAN POTASSIUM PO Take 100 mg by mouth At Bedtime   5/28/2018 at Unknown time     multivitamin, therapeutic with minerals (THERA-VIT-M) TABS tablet Take 1 tablet by mouth daily   5/28/2018 at Unknown time     OMEPRAZOLE PO Take 40 mg by mouth daily (with dinner)    5/28/2018 at Unknown time     ondansetron (ZOFRAN-ODT) 4 MG ODT tab Take 1 tablet (4 mg) by mouth every 6 hours as needed for nausea or vomiting 20 tablet 0 5/28/2018 at Unknown time     triamterene-hydrochlorothiazide (MAXZIDE-25) 37.5-25 MG per tablet Take 1 tablet by mouth every morning   5/28/2018 at Unknown time     VITAMIN D, CHOLECALCIFEROL, PO Take 1 tablet by mouth daily (with dinner)   5/28/2018 at Unknown time     albuterol (PROAIR HFA/PROVENTIL HFA/VENTOLIN HFA) 108 (90 BASE) MCG/ACT Inhaler Inhale 1-2 puffs into the lungs every 4 hours as needed for shortness of breath / dyspnea or wheezing   More than a month at Unknown time     senna-docusate (SENOKOT-S;PERICOLACE) 8.6-50 MG per tablet Take 1-2 tablets by mouth 2 times daily 50 tablet 0 Unknown at Unknown time       Review of Systems:   A Comprehensive greater than 10 system review of systems was carried out.  Pertinent positives and negatives are noted above.  Otherwise negative for contributory information.        Physical Exam:  Blood pressure 127/60, pulse 66, temperature 96.4  F (35.8  C), temperature source Oral, resp. rate 16, height 1.575 m (5' 2\"), weight 64.4 kg (142 lb), SpO2 92 %.  Gen: Pleasant in no acute distress.  HEENT: NCAT. EOMI. PERRL.  Neck: Normal inspection. No bruit, JVD or thyromegaly.  Lungs: Normal respiratory effort. Clear to auscultation bilaterally with no crackles or wheezes.  Card: N s1s2. RRR. No M/R/G.  " Peripheral pulses present and symmetric.   Skin: No rash. Warm to the touch  Extr: No edema. CMS intact  Psychiatric: Patient alert oriented ×3.  Normal affect  Neurologic: Cranial nerves II-XII are intact.  Sensation normal.  Motor strength 5/5    Data:       Recent Labs  Lab 05/30/18  0706   HGB 10.2*       Recent Labs  Lab 05/30/18  0706   *       Imaging:   Recent Results (from the past 24 hour(s))   XR Knee Port Left 1/2 Views    Narrative    KNEE ONE-TWO VIEWS LEFT  5/29/2018 3:01 PM     HISTORY: Post-Op Total Knee;     COMPARISON: Film dated 12/8/2005.    FINDINGS: Patient is status post a revision the left knee  arthroplasty. Longstem femoral and tibial components are in place..   The components appear to be in proper position. No postoperative  complications are identified.    MD Christ HERNANDEZ MD Pager [phone]

## 2018-05-30 NOTE — PLAN OF CARE
Problem: Patient Care Overview  Goal: Plan of Care/Patient Progress Review  PT: Orders received. PT evaluation completed and treatment initiated. Pt reports living in a house with 2 steps to enter and a full flight to bedroom. Pt has a walk in shower with built in bench, toilet riser, reacher, walker, and walking sticks. Pt works as a  and plans on returning to work next week.     Discharge Planner PT   Patient plan for discharge: Home tomorrow  Current status: Pt completes sit<>stand with SBA. Pt completes sit<>supine with SBA and cues for technique. Pt able to complete SLR indep, therefore no KI needed. Pt completes 130' ambulation with FWW and CGA progressing to SBA. Overall, pt tolerates session well and is mobilizing well.   Barriers to return to prior living situation: Stairs to enter home and within home  Recommendations for discharge: Home  Rationale for recommendations: Anticipate that with continued IPPT the pt will be able to complete all mobility skills required for safe discharge home.        Entered by: Marisela Rojas 05/30/2018 10:28 AM

## 2018-05-30 NOTE — PROGRESS NOTES
05/30/18 1004   Quick Adds   Type of Visit Initial PT Evaluation   Living Environment   Lives With spouse   Living Arrangements house   Home Accessibility stairs to enter home;stairs within home   Number of Stairs to Enter Home 2   Number of Stairs Within Home 12   Stair Railings at Home inside, present on left side   Transportation Available family or friend will provide   Self-Care   Dominant Hand left   Usual Activity Tolerance good   Current Activity Tolerance moderate   Regular Exercise yes   Activity/Exercise Type walking   Exercise Amount/Frequency daily   Equipment Currently Used at Home other (see comments)  (walking stick)   Activity/Exercise/Self-Care Comment has walking sticks and 4WW for use at home   Functional Level Prior   Ambulation 0-->independent   Transferring 0-->independent   Toileting 0-->independent   Bathing 0-->independent   Dressing 0-->independent   Eating 0-->independent   Fall history within last six months no   Which of the above functional risks had a recent onset or change? ambulation;transferring;toileting;bathing   General Information   Onset of Illness/Injury or Date of Surgery - Date 05/29/18   Referring Physician Fab Ennis MD   Patient/Family Goals Statement Return home tomorrow.    Pertinent History of Current Problem (include personal factors and/or comorbidities that impact the POC) Pt is POD1 L TKA revision. Past medical history significant for hypertension, paroxysmal atrial fibrillation, nonischemic cardiomyopathy suspected to be due to tachycardic cardiomyopathy with EF of 45-55%, left bundle branch block, and occasional migraines.   Precautions/Limitations fall precautions   Weight-Bearing Status - LLE weight-bearing as tolerated   Weight-Bearing Status - RLE full weight-bearing   General Observations Pt sitting in bedside chair at initiation of session. Agreeable to session.    Cognitive Status Examination   Orientation orientation to person, place and time  "  Level of Consciousness alert   Follows Commands and Answers Questions 100% of the time   Personal Safety and Judgment intact   Memory intact   Pain Assessment   Patient Currently in Pain Yes, see Vital Sign flowsheet   Integumentary/Edema   Integumentary/Edema Comments Ace wrap intact to L LE   Posture    Posture Forward head position;Protracted shoulders   Range of Motion (ROM)   ROM Comment L knee flexion ~45degrees, All other ROM WNL   Strength   Strength Comments Able to SLR and good quad set B LE   Bed Mobility   Bed Mobility Comments sit<>supine with SBA   Transfer Skills   Transfer Comments SBA with sit<>stand   Gait   Gait Comments CGA with FWW   Sensory Examination   Sensory Perception no deficits were identified   Coordination   Coordination no deficits were identified   Muscle Tone   Muscle Tone no deficits were identified   Modality Interventions   Planned Modality Interventions Cryotherapy   General Therapy Interventions   Planned Therapy Interventions bed mobility training;gait training;ROM;strengthening;stretching;transfer training;risk factor education;home program guidelines   Clinical Impression   Criteria for Skilled Therapeutic Intervention yes, treatment indicated   PT Diagnosis Impaired functional mobility   Influenced by the following impairments Pain, weakness,    Functional limitations due to impairments Difficulty with bed mobility, transfers, ambulation, stairs   Clinical Presentation Stable/Uncomplicated   Clinical Presentation Rationale medically stable, progressing as expected post-op   Clinical Decision Making (Complexity) Low complexity   Therapy Frequency` 2 times/day   Predicted Duration of Therapy Intervention (days/wks) 3 days   Anticipated Discharge Disposition Home with Outpatient Therapy   Risk & Benefits of therapy have been explained Yes   Patient, Family & other staff in agreement with plan of care Yes   [facility] AM-PAC TM \"6 Clicks\"   2016, Trustees of [place] " "[address], under license to [employer].  All rights reserved.   6 Clicks Short Forms Basic Mobility Inpatient Short Form   [facility] AM-PAC  \"6 Clicks\" V.2 Basic Mobility Inpatient Short Form   1. Turning from your back to your side while in a flat bed without using bedrails? 4 - None   2. Moving from lying on your back to sitting on the side of a flat bed without using bedrails? 3 - A Little   3. Moving to and from a bed to a chair (including a wheelchair)? 3 - A Little   4. Standing up from a chair using your arms (e.g., wheelchair, or bedside chair)? 3 - A Little   5. To walk in hospital room? 3 - A Little   6. Climbing 3-5 steps with a railing? 3 - A Little   Basic Mobility Raw Score (Score out of 24.Lower scores equate to lower levels of function) 19   Total Evaluation Time   Total Evaluation Time (Minutes) 8     "

## 2018-05-30 NOTE — PLAN OF CARE
Problem: Patient Care Overview  Goal: Plan of Care/Patient Progress Review  OT: Order received, chart reviewed, pt currently POD#1 L TKA revision. Pt has had several knee surgeries in the past and has equipment including: walking sticks, walker, reacher, RTS and has walk-in shower with built in chair    Discharge Planner OT   Patient plan for discharge: home  Current status: Pt able to complete mobility and transfers with PT, FWW, CGA-SBA; pt has AE from previous knee surgeries, currently demonstrating no IP OT needs, will complete order  Barriers to return to prior living situation: defer to PT: stairs to enter home and within home  Recommendations for discharge: defer to PT: home  Rationale for recommendations: no skilled IP OT needs at this time, pt has had OT in the past with previous knee surgeries       Entered by: Aby Blackman 05/30/2018 10:36 AM

## 2018-05-30 NOTE — CONSULTS
Care Transitions Team: Following for CC, discharge planning, and disposition.        Per chart review MD has consulted SWS for CPM/and potential for disposition concerns.     Per PT  POD 1 assessment   Discharge Planner PT   Patient plan for discharge: Home tomorrow  Current status: Pt completes sit<>stand with SBA. Pt completes sit<>supine with SBA and cues for technique. Pt able to complete SLR indep, therefore no KI needed. Pt completes 130' ambulation with FWW and CGA progressing to SBA. Overall, pt tolerates session well and is mobilizing well.   Barriers to return to prior living situation: Stairs to enter home and within home  Recommendations for discharge: Home  Rationale for recommendations: Anticipate that with continued IPPT the pt will be able to complete all mobility skills required for safe discharge home.       Not anticipating any disposition concerns, please re-consult CM/SWS with changes to POC.

## 2018-05-30 NOTE — PLAN OF CARE
Problem: Patient Care Overview  Goal: Plan of Care/Patient Progress Review  Outcome: Improving  A&O. VSS. LS clear. +BS. Passing flatus. Dressing and CMS intact. Assist of 1 with a walker and gait belt to bedside commode. Voiding small amounts. Pain is managed with oral medications.     Patient declined straight cath this morning, stated wanted to try voiding more later.

## 2018-05-30 NOTE — PLAN OF CARE
Problem: Patient Care Overview  Goal: Plan of Care/Patient Progress Review  Outcome: Improving  Pt A&O. VS stable; afebrile. PO pain meds managing pain. CMS intact. Dressing CDI; ace wrap on. Up w/ SBA, gait belt, and walker. Voiding in good amts. Tolerating regular diet. Plan is to d/c home tomorrow. Will continue to monitor.

## 2018-05-31 ENCOUNTER — APPOINTMENT (OUTPATIENT)
Dept: PHYSICAL THERAPY | Facility: CLINIC | Age: 78
DRG: 467 | End: 2018-05-31
Attending: ORTHOPAEDIC SURGERY
Payer: MEDICARE

## 2018-05-31 VITALS
WEIGHT: 142 LBS | DIASTOLIC BLOOD PRESSURE: 56 MMHG | TEMPERATURE: 97.3 F | BODY MASS INDEX: 26.13 KG/M2 | RESPIRATION RATE: 16 BRPM | HEART RATE: 66 BPM | HEIGHT: 62 IN | SYSTOLIC BLOOD PRESSURE: 135 MMHG | OXYGEN SATURATION: 95 %

## 2018-05-31 LAB
CREAT SERPL-MCNC: 0.7 MG/DL (ref 0.52–1.04)
GFR SERPL CREATININE-BSD FRML MDRD: 80 ML/MIN/1.7M2
GLUCOSE SERPL-MCNC: 130 MG/DL (ref 70–99)
HGB BLD-MCNC: 9.3 G/DL (ref 11.7–15.7)
INR PPP: 1.2 (ref 0.86–1.14)

## 2018-05-31 PROCEDURE — 82947 ASSAY GLUCOSE BLOOD QUANT: CPT | Performed by: ORTHOPAEDIC SURGERY

## 2018-05-31 PROCEDURE — A9270 NON-COVERED ITEM OR SERVICE: HCPCS | Mod: GY | Performed by: ORTHOPAEDIC SURGERY

## 2018-05-31 PROCEDURE — 97116 GAIT TRAINING THERAPY: CPT | Mod: GP | Performed by: PHYSICAL THERAPY ASSISTANT

## 2018-05-31 PROCEDURE — 36415 COLL VENOUS BLD VENIPUNCTURE: CPT | Performed by: ORTHOPAEDIC SURGERY

## 2018-05-31 PROCEDURE — 85610 PROTHROMBIN TIME: CPT | Performed by: ORTHOPAEDIC SURGERY

## 2018-05-31 PROCEDURE — 25000132 ZZH RX MED GY IP 250 OP 250 PS 637: Mod: GY | Performed by: PHYSICIAN ASSISTANT

## 2018-05-31 PROCEDURE — 40000193 ZZH STATISTIC PT WARD VISIT: Performed by: PHYSICAL THERAPY ASSISTANT

## 2018-05-31 PROCEDURE — A9270 NON-COVERED ITEM OR SERVICE: HCPCS | Mod: GY | Performed by: PHYSICIAN ASSISTANT

## 2018-05-31 PROCEDURE — 85018 HEMOGLOBIN: CPT | Performed by: ORTHOPAEDIC SURGERY

## 2018-05-31 PROCEDURE — 82565 ASSAY OF CREATININE: CPT | Performed by: ORTHOPAEDIC SURGERY

## 2018-05-31 PROCEDURE — 97110 THERAPEUTIC EXERCISES: CPT | Mod: GP | Performed by: PHYSICAL THERAPY ASSISTANT

## 2018-05-31 PROCEDURE — 25000132 ZZH RX MED GY IP 250 OP 250 PS 637: Mod: GY | Performed by: ORTHOPAEDIC SURGERY

## 2018-05-31 RX ADMIN — ACETAMINOPHEN 975 MG: 325 TABLET, FILM COATED ORAL at 09:24

## 2018-05-31 RX ADMIN — APIXABAN 5 MG: 2.5 TABLET, FILM COATED ORAL at 09:25

## 2018-05-31 RX ADMIN — SENNOSIDES AND DOCUSATE SODIUM 2 TABLET: 8.6; 5 TABLET ORAL at 09:24

## 2018-05-31 RX ADMIN — TRAMADOL HYDROCHLORIDE 50 MG: 50 TABLET, FILM COATED ORAL at 05:14

## 2018-05-31 RX ADMIN — TRAMADOL HYDROCHLORIDE 50 MG: 50 TABLET, FILM COATED ORAL at 11:06

## 2018-05-31 RX ADMIN — CARVEDILOL 12.5 MG: 12.5 TABLET, FILM COATED ORAL at 09:24

## 2018-05-31 RX ADMIN — ACETAMINOPHEN 975 MG: 325 TABLET, FILM COATED ORAL at 00:19

## 2018-05-31 NOTE — DISCHARGE INSTRUCTIONS
Return to clinic in 10-14 days.  Call [phone] to schedule or if you experience any problems before your scheduled appointment.      See general discharge instruction sheet for knees  1. Do exercises at home as instructed by therapist twice a day. Continue outpatient therapy as ordered by your doctor.  2. Ice knee after exercises and therapy.  3. Examine dressing daily for problems.  4. May shower, can get dressing wet, no soaking (no bathtubs, pools or hot tubs)  5. Notify your dentist or physician of your implant so you can get antibiotics before any dental work or before any invasive procedure (ie: colonoscopy)  6. Remove anti-embolism stockings (Jhony hose) for 30 minutes twice daily.      Aquacel dressing:  DO NOT CHANGE DRESSING DAILY.  Leave this dressing on until follow-up appointment with Orthopedic Surgeon.  Dressing is waterproof, can shower with it on, pat dry when done.  No soaking such as in tub baths, pools or hot tubs        While on narcotic pain medication, to prevent constipation:  1. Drink plenty of water to keep well hydrated   2. May take over the counter Colace or Senna (follow instructions on label)        Call your physician if: ([phone])   1. Persistent fever greater than 100 degrees with body chills or excessive sweating.  2. Increased/persistent redness, localized warmth, tenderness, drainage or swelling at incision site. Greater than 50% drainage on dressing.   3. Persistent pain not controlled with oral pain medications, ice and rest.   4. No bowel movement in 3 days (may use Milk of Magnesia or other over the counter remedy).  5. Chest pain, shortness of breath, and/or calf pain with excessive swelling.  6. Generalized feeling of illness.  7. Any other questions or concerns related to your surgery/recovery.    Thank you for allowing [facility] to participate in your cares!!

## 2018-05-31 NOTE — PLAN OF CARE
After Visit Summary   3/19/2018    Sejal Devlin    MRN: [id number]           Patient Information     Date Of Birth          1959        Visit Information        Provider Department      3/19/2018 2:00 PM Jackie Joshua MD [facility]        Today's Diagnoses     SOB (shortness of breath)    -  1    Gastroesophageal reflux disease, esophagitis presence not specified          Care Instructions    1.  Start omeprazole 40mg once daily with a meal.  I would recommend you try this for at least 2 weeks.  If it helps, then we can continue it and move down to a lower dose if able.     2.  If the sensation in your chest does not resolve with the omeprazole, then I will refer you on to GI to have EGD (upper endoscopy).      3.  If the shortness of breath does not improve with the above treatment, then I am going to have you do an echocardiogram.     4.  Lung nodule seen July 2017--recommended recheck in 3 months?          Follow-ups after your visit        Your next 10 appointments already scheduled     Mar 19, 2018  2:40 PM CDT   (Arrive by 2:25 PM)   [facility] CHEST 2 VIEWS with [facility]   [facility]    [address] [place] [address] [phone]           Please bring a list of your current medicines to your exam. (Include vitamins, minerals and over-thecounter medicines.) Leave your valuables at home.  Tell your doctor if there is a chance you may be pregnant.  You do not need to do anything special for this exam.            Oct 11, 2018  9:00 AM CDT   (Arrive by 8:45 AM)   Return Visit with Acacia Chandler MD   [facility] ([facility])    [address]  [address]  [facility] [address] [phone]              Who to contact     If you have questions or need follow up information about today's clinic visit or your schedule please contact [facility]  Problem: Patient Care Overview  Goal: Plan of Care/Patient Progress Review  Outcome: Adequate for Discharge Date Met: 05/31/18  Pt A&O. VS stable; afebrile. PO pain meds managing pain. CMS intact. Dressing: Scant amount of dried drainage. Up w/ SBA, gait belt, and walker. Voiding in good amts. Tolerating regular diet.     Reviewed discharge instructions and medications with patient and spouse. Questions answered. Patient discharged to home with discharge instructions, filled medications (Tramadol), and belongings at this time.       directly at [phone].  Normal or non-critical lab and imaging results will be communicated to you by MyChart, letter or phone within 4 business days after the clinic has received the results. If you do not hear from us within 7 days, please contact the clinic through Phigitalhart or phone. If you have a critical or abnormal lab result, we will notify you by phone as soon as possible.  Submit refill requests through Cosyforyou or call your pharmacy and they will forward the refill request to us. Please allow 3 business days for your refill to be completed.          Additional Information About Your Visit        Phigitalhart Information     [employer] gives you secure access to your electronic health record. If you see a primary care provider, you can also send messages to your care team and make appointments. If you have questions, please call your primary care clinic.  If you do not have a primary care provider, please call [phone] and they will assist you.        Care EveryWhere ID     This is your Care EveryWhere ID. This could be used by other organizations to access your [place] medical records  [id number]        Your Vitals Were     Pulse Temperature Last Period Pulse Oximetry BMI (Body Mass Index)       84 97  F (36.1  C) (Oral) 12/18/2004 96% 27.36 kg/m2        Blood Pressure from Last 3 Encounters:   03/19/18 128/80   01/29/18 123/80   01/18/18 138/87    Weight from Last 3 Encounters:   03/19/18 152 lb (68.9 kg)   01/29/18 152 lb 12.8 oz (69.3 kg)   01/18/18 151 lb (68.5 kg)              We Performed the Following     EKG 12-lead complete w/read - Clinics          Today's Medication Changes          These changes are accurate as of 3/19/18  2:39 PM.  If you have any questions, ask your nurse or doctor.               Start taking these medicines.        Dose/Directions    omeprazole 40 MG capsule   Commonly known as:  priLOSEC   Used for:  Gastroesophageal reflux disease, esophagitis presence not specified    Started by:  Jackie Joshua MD        Dose:  40 mg   Take 1 capsule (40 mg) by mouth daily Take 30-60 minutes before a meal.   Quantity:  30 capsule   Refills:  1            Where to get your medicines      These medications were sent to [facility] [address] [facility] - [place], [place] - [address]  [address], [place] [address]     Phone:  [phone]     omeprazole 40 MG capsule                Primary Care Provider Office Phone # Fax #    Marita JONATAN Roa [phone]       [address] [facility] [place]  [place] [address]        Equal Access to Services     [facility] : Hadii thiago ku hadasho Soomaali, waaxda luqadaha, qaybta kaalmada adeegyada, carly veliz . So [facility] [phone].    ATENCIÓN: Si habla español, tiene a badillo disposición servicios gratuitos de asistencia lingüística. Ll[facility]ulton County [facility] Center [phone].    We comply with applicable federal civil rights laws and [place] laws. We do not discriminate on the basis of race, color, national origin, age, disability, sex, sexual orientation, or gender identity.            Thank you!     Thank you for choosing [facility]  for your care. Our goal is always to provide you with excellent care. Hearing back from our patients is one way we can continue to improve our services. Please take a few minutes to complete the written survey that you may receive in the mail after your visit with us. Thank you!             Your Updated Medication List - Protect others around you: Learn how to safely use, store and throw away your medicines at www.disposemymeds.org.          This list is accurate as of 3/19/18  2:39 PM.  Always use your most recent med list.                   Brand Name Dispense Instructions for use Diagnosis    acetaminophen-codeine 300-30 MG per tablet    TYLENOL #3    10 tablet    Take 1 tablet by mouth every 8 hours as needed for pain maximum 2 tablet(s) per day    Cervicalgia        cyclobenzaprine 10 MG tablet    FLEXERIL    30 tablet    Take 0.5-1 tablets (5-10 mg) by mouth 3 times daily as needed for muscle spasms    Cervicalgia       FAMOTIDINE PO      Take 20 mg by mouth daily        GNP GARLIC EXTRACT PO           omeprazole 40 MG capsule    priLOSEC    30 capsule    Take 1 capsule (40 mg) by mouth daily Take 30-60 minutes before a meal.    Gastroesophageal reflux disease, esophagitis presence not specified       VITAMIN D (CHOLECALCIFEROL) PO      Take by mouth daily

## 2018-05-31 NOTE — PLAN OF CARE
Problem: Patient Care Overview  Goal: Plan of Care/Patient Progress Review    Discharge Planner PT   Patient plan for discharge: Home tomorrow  Current status: Pt amb 350' with ww independently with = step length. Goal met Pt performed 8 steps with 1 rail and SEC with step gait pattern with SBA . Vcs for first 4 steps to performed step to vs reciprocal. Goal met Pt is indep with all transfers. Goal met  Barriers to return to prior living situation: Stairs to enter home and within home  Recommendations for discharge: Home  Rationale for recommendations: Anticipate that with continued IPPT the pt will be able to complete all mobility skills required for safe discharge home.     PT - Pt discharged from PT with all goals met. Pt has her own wheeled walker. Please refer to discharge summary.        Entered by: Suad Nieves 05/31/2018 9:14 AM

## 2018-05-31 NOTE — OP NOTE
Procedure Date: 05/29/2018      DATE OF SERVICE: 05/31/2018      PREOPERATIVE DIAGNOSIS:  Loose left total knee arthroplasty.      POSTOPERATIVE DIAGNOSIS:  Loose left total knee arthroplasty.      PROCEDURE:  Revision left total knee arthroplasty.      SURGEON:  Fab Ennis MD      FIRST ASSISTANT:  Jc Pugh PA-C.      ANESTHESIA:  Block with general.      INDICATIONS:  Ms. Amaya is a woman who underwent a left total knee arthroplasty approximately 12 years ago at an outside institution.  Over the past 2-3 years, the arthroplasty has become painful.  She went back to the original surgeon with complaints of pain. Five months ago she underwent a polyethylene exchange.  Following the polyethylene exchange, she has continued to have pain and disability in her life.  She was recently seen in the office and it was felt that her components were loose and mildly malpositioned.  She is brought to surgery for total knee revision arthroplasty.      DESCRIPTION OF PROCEDURE:  After obtaining informed surgical consent, the patient was brought to the operating room and administered both a block and a general anesthetic.  The left leg was prepped and draped in the usual sterile fashion.  Following exsanguination, the tourniquet was inflated.  The old incision was utilized and extended both proximally and distally.  Dissection was carried through a medial parapatellar incision into the joint.  There was minimal clear synovial fluid.  There was very thick hypertrophic synovial response in the joint.  A synovectomy was performed.  The femoral component was checked for stability and found to be grossly loose.  It was removed without any extraction instrumentation.  Once the component had been removed, the distal femur was inspected.  There were quite dramatic cavitary defects on the medial and lateral femoral condyles.  The medial femoral condyle posteriorly was a large cavernous lesion suggesting long-term loosening  of the femoral component.  Virtually all of the cement came out with the component.  The remaining cement was gently debrided from the distal femur.  The tibial component was likewise removed quite easily.  The cement mantle around the stem was removed from the femoral canal.  The tibial component had eroded through the lateral cortex of the tibia into the anterolateral compartment.  The defect was approximately 3 x 5 mm in size.  The patellar component was likewise removed.  The knee was carefully debrided of all scar tissue, and remaining synovial tissue.  The femoral canal was then opened.  The canal was sequentially reamed to 13 mm.  The 13 mm alexa was left in place.  The distal cutting block was applied.  Clean-up cuts were made.  In essence, the cuts were made at the level of the epicondyles.  A size 2 anterior, posterior cutting block was applied.  The anterior skin cut was made.  The posterior cuts were made. Chamfer cuts were then completed.  A box was then cut out of the distal femur to accept a stabilized component.  In order to achieve an appropriate joint line and stability of the component, 5 mm posterior wedges were placed on the femoral component, 10 mm distal wedges were placed medially and laterally.  A 150 mm x 12 mm stem was applied to the femoral component.  The  trial component was placed on the distal femur and fit very nicely.  The proximal tibia had a large metaphyseal defect.  I decided to use a cone to augment the proximal tibia.  A size A cone was used in anticipation of a size 2 tibial tray. The tibial canal was then reamed to 12 mm.  A skin cut was made on the proximal tibia.  A size 2 tray with a 12 x 150 stem to bypass the defect and achieve stability in the distal tibia was selected.  This was inserted and aligned rotationally with the femoral component.  The trials were then removed.  The underside of the patella was cut.  A 29 template provided appropriate coverage.  Drill holes  were made.  The femoral and tibial canals were plugged with 25 and 18.5  box plugs respectively.  The knee was irrigated with antibiotic irrigating solution.  T size A cone was implanted in an ingrowth fashion in the tibia.  The tibial canal was then filled with cement and pressurized.  A size 2 tray with 10 mm posterior augments and a 12 x 150 mm stem was then cemented in place in anatomic rotation.  The excess cement was removed.  The femoral defects were then packed with cement.  The canal was filled with pressurized cement. The size 2 femoral component with 10 mm distal augments, 5 mm posterior augments and a 12 x 150 mm stem was then cemented in place. All excess cement was removed.  The knee was brought into full extension and the cement allowed to cure. The patella was then cemented in place and the excess cement was removed.  Ultimately, an 11 mm spacer provided full extension and flexion of at least 130 degrees.  The knee was stable in extension at 30 degrees of flexion and 90 degrees of flexion.  The component was implanted.  The knee was then irrigated with antibiotic irrigating solution and closed in layers over a drains with #1 Vicryl, 2-0 Vicryl and staples.  A sterile dressing was applied.  The tourniquet was deflated.  There were no intraoperative complications.  Blood loss was minimal.  Sponge and needle counts correct.  The patient returned to the recovery room in stable condition.         YARELY VAZQUEZ MD             D: 2018   T: 2018   MT: ARABELLA      Name:     OMID PAZ   MRN:      [id number]        Account:        [id number]   :      1940           Procedure Date: 2018      Document: [id number]

## 2018-05-31 NOTE — PLAN OF CARE
Problem: Patient Care Overview  Goal: Plan of Care/Patient Progress Review  Outcome: Improving  Vital signs stable.  Lungs clear, encouraged inspirometer use.  Bowel sounds hypoactive, passing flatus, voiding.  CMS intact to lower extremities,dressing dry.  Ambulated with sba of 1.  Hgb 10.2.  Pain controlled with tylenol and ultram.  Plan of care reviewed with pt and spouse.

## 2018-05-31 NOTE — PLAN OF CARE
Problem: Patient Care Overview  Goal: Plan of Care/Patient Progress Review  Outcome: Improving  Pt alert and oriented. VVS. CMS intact. Regular diet.Stand by assist of one.No use of immobilizer.Ace wraps removed. Pain controlled by Tramadol and Tylenol.Pt likely to d/c home today.

## 2018-05-31 NOTE — PROGRESS NOTES
Chart reviewed.  Vitals are otherwise stable.  Discharge planning in progress.  Okay to discharge from medicine perspective.

## 2018-06-14 NOTE — DISCHARGE SUMMARY
Suzi is a 77 year old female admitted to the hospital with severe, disabling knee pain.  Patient underwent uncomplicated revision knee replacement.  Patient progressed steadily and satisfactorily through physical therapy and was ultimately discharged to home. There were no intraoperative or postoperative complications during hospitalization. The patient will follow up with Dr. Ennis in ten days.     For further remaining details of the hospitalization, please see the medical record.

## 2019-11-12 ENCOUNTER — HOSPITAL ENCOUNTER (INPATIENT)
Facility: CLINIC | Age: 79
LOS: 2 days | Discharge: HOME | DRG: 378 | End: 2019-11-14
Attending: NURSE PRACTITIONER | Admitting: INTERNAL MEDICINE
Payer: COMMERCIAL

## 2019-11-12 DIAGNOSIS — Z79.01 CHRONIC ANTICOAGULATION: ICD-10-CM

## 2019-11-12 DIAGNOSIS — K92.1 MELENA: ICD-10-CM

## 2019-11-12 DIAGNOSIS — K92.2 GASTROINTESTINAL HEMORRHAGE, UNSPECIFIED GASTROINTESTINAL HEMORRHAGE TYPE: ICD-10-CM

## 2019-11-12 DIAGNOSIS — R42 DIZZINESS: ICD-10-CM

## 2019-11-12 LAB
ALBUMIN SERPL-MCNC: 3.2 G/DL (ref 3.4–5)
ALP SERPL-CCNC: 43 U/L (ref 40–150)
ALT SERPL W P-5'-P-CCNC: 17 U/L (ref 0–50)
ANION GAP SERPL CALCULATED.3IONS-SCNC: 9 MMOL/L (ref 3–14)
AST SERPL W P-5'-P-CCNC: 14 U/L (ref 0–45)
BASOPHILS # BLD AUTO: 0 10E9/L (ref 0–0.2)
BASOPHILS NFR BLD AUTO: 0.4 %
BILIRUB SERPL-MCNC: 0.5 MG/DL (ref 0.2–1.3)
BUN SERPL-MCNC: 38 MG/DL (ref 7–30)
CALCIUM SERPL-MCNC: 8.3 MG/DL (ref 8.5–10.1)
CHLORIDE SERPL-SCNC: 102 MMOL/L (ref 94–109)
CO2 SERPL-SCNC: 27 MMOL/L (ref 20–32)
CREAT SERPL-MCNC: 1.02 MG/DL (ref 0.52–1.04)
DIFFERENTIAL METHOD BLD: ABNORMAL
EOSINOPHIL # BLD AUTO: 0 10E9/L (ref 0–0.7)
EOSINOPHIL NFR BLD AUTO: 0.3 %
ERYTHROCYTE [DISTWIDTH] IN BLOOD BY AUTOMATED COUNT: 13 % (ref 10–15)
GFR SERPL CREATININE-BSD FRML MDRD: 52 ML/MIN/{1.73_M2}
GLUCOSE SERPL-MCNC: 122 MG/DL (ref 70–99)
HCT VFR BLD AUTO: 24.9 % (ref 35–47)
HEMOCCULT STL QL: POSITIVE
HGB BLD-MCNC: 6.2 G/DL (ref 11.7–15.7)
HGB BLD-MCNC: 7.3 G/DL (ref 11.7–15.7)
HGB BLD-MCNC: 8 G/DL (ref 11.7–15.7)
IMM GRANULOCYTES # BLD: 0.1 10E9/L (ref 0–0.4)
IMM GRANULOCYTES NFR BLD: 0.6 %
INR PPP: 1.8 (ref 0.86–1.14)
LYMPHOCYTES # BLD AUTO: 1.8 10E9/L (ref 0.8–5.3)
LYMPHOCYTES NFR BLD AUTO: 17.1 %
MCH RBC QN AUTO: 28.9 PG (ref 26.5–33)
MCHC RBC AUTO-ENTMCNC: 32.1 G/DL (ref 31.5–36.5)
MCV RBC AUTO: 90 FL (ref 78–100)
MONOCYTES # BLD AUTO: 0.5 10E9/L (ref 0–1.3)
MONOCYTES NFR BLD AUTO: 5 %
NEUTROPHILS # BLD AUTO: 7.9 10E9/L (ref 1.6–8.3)
NEUTROPHILS NFR BLD AUTO: 76.6 %
PLATELET # BLD AUTO: 259 10E9/L (ref 150–450)
POTASSIUM SERPL-SCNC: 3.8 MMOL/L (ref 3.4–5.3)
PROT SERPL-MCNC: 5.8 G/DL (ref 6.8–8.8)
RBC # BLD AUTO: 2.77 10E12/L (ref 3.8–5.2)
SODIUM SERPL-SCNC: 138 MMOL/L (ref 133–144)
WBC # BLD AUTO: 10.3 10E9/L (ref 4–11)

## 2019-11-12 PROCEDURE — 82272 OCCULT BLD FECES 1-3 TESTS: CPT | Performed by: NURSE PRACTITIONER

## 2019-11-12 PROCEDURE — 85610 PROTHROMBIN TIME: CPT | Performed by: NURSE PRACTITIONER

## 2019-11-12 PROCEDURE — 21000000 ZZH R&B IMCU HEART CARE

## 2019-11-12 PROCEDURE — 85018 HEMOGLOBIN: CPT | Performed by: INTERNAL MEDICINE

## 2019-11-12 PROCEDURE — 86901 BLOOD TYPING SEROLOGIC RH(D): CPT | Performed by: NURSE PRACTITIONER

## 2019-11-12 PROCEDURE — 25000128 H RX IP 250 OP 636: Performed by: INTERNAL MEDICINE

## 2019-11-12 PROCEDURE — 36415 COLL VENOUS BLD VENIPUNCTURE: CPT | Performed by: INTERNAL MEDICINE

## 2019-11-12 PROCEDURE — 85025 COMPLETE CBC W/AUTO DIFF WBC: CPT | Performed by: NURSE PRACTITIONER

## 2019-11-12 PROCEDURE — 96374 THER/PROPH/DIAG INJ IV PUSH: CPT

## 2019-11-12 PROCEDURE — 99285 EMERGENCY DEPT VISIT HI MDM: CPT

## 2019-11-12 PROCEDURE — C9113 INJ PANTOPRAZOLE SODIUM, VIA: HCPCS | Performed by: NURSE PRACTITIONER

## 2019-11-12 PROCEDURE — 25800030 ZZH RX IP 258 OP 636: Performed by: NURSE PRACTITIONER

## 2019-11-12 PROCEDURE — 25000128 H RX IP 250 OP 636: Performed by: NURSE PRACTITIONER

## 2019-11-12 PROCEDURE — 86923 COMPATIBILITY TEST ELECTRIC: CPT | Performed by: NURSE PRACTITIONER

## 2019-11-12 PROCEDURE — 86850 RBC ANTIBODY SCREEN: CPT | Performed by: NURSE PRACTITIONER

## 2019-11-12 PROCEDURE — 99223 1ST HOSP IP/OBS HIGH 75: CPT | Mod: AI | Performed by: INTERNAL MEDICINE

## 2019-11-12 PROCEDURE — 86900 BLOOD TYPING SEROLOGIC ABO: CPT | Performed by: NURSE PRACTITIONER

## 2019-11-12 PROCEDURE — 80053 COMPREHEN METABOLIC PANEL: CPT | Performed by: NURSE PRACTITIONER

## 2019-11-12 RX ORDER — ACETAMINOPHEN 325 MG/1
650 TABLET ORAL EVERY 4 HOURS PRN
Status: DISCONTINUED | OUTPATIENT
Start: 2019-11-12 | End: 2019-11-14 | Stop reason: HOSPADM

## 2019-11-12 RX ORDER — ONDANSETRON 2 MG/ML
4 INJECTION INTRAMUSCULAR; INTRAVENOUS EVERY 6 HOURS PRN
Status: DISCONTINUED | OUTPATIENT
Start: 2019-11-12 | End: 2019-11-14 | Stop reason: HOSPADM

## 2019-11-12 RX ORDER — ONDANSETRON 4 MG/1
4 TABLET, ORALLY DISINTEGRATING ORAL EVERY 6 HOURS PRN
Status: DISCONTINUED | OUTPATIENT
Start: 2019-11-12 | End: 2019-11-14 | Stop reason: HOSPADM

## 2019-11-12 RX ORDER — LIDOCAINE 40 MG/G
CREAM TOPICAL
Status: DISCONTINUED | OUTPATIENT
Start: 2019-11-12 | End: 2019-11-14 | Stop reason: HOSPADM

## 2019-11-12 RX ORDER — ACETAMINOPHEN 650 MG/1
650 SUPPOSITORY RECTAL EVERY 4 HOURS PRN
Status: DISCONTINUED | OUTPATIENT
Start: 2019-11-12 | End: 2019-11-14 | Stop reason: HOSPADM

## 2019-11-12 RX ORDER — POTASSIUM CHLORIDE 1500 MG/1
20-40 TABLET, EXTENDED RELEASE ORAL
Status: DISCONTINUED | OUTPATIENT
Start: 2019-11-12 | End: 2019-11-14 | Stop reason: HOSPADM

## 2019-11-12 RX ORDER — ALBUTEROL SULFATE 90 UG/1
1-2 AEROSOL, METERED RESPIRATORY (INHALATION) EVERY 4 HOURS PRN
Status: DISCONTINUED | OUTPATIENT
Start: 2019-11-12 | End: 2019-11-14 | Stop reason: HOSPADM

## 2019-11-12 RX ORDER — SODIUM CHLORIDE AND POTASSIUM CHLORIDE 150; 900 MG/100ML; MG/100ML
INJECTION, SOLUTION INTRAVENOUS CONTINUOUS
Status: DISCONTINUED | OUTPATIENT
Start: 2019-11-12 | End: 2019-11-13

## 2019-11-12 RX ORDER — PROCHLORPERAZINE 25 MG
12.5 SUPPOSITORY, RECTAL RECTAL EVERY 12 HOURS PRN
Status: DISCONTINUED | OUTPATIENT
Start: 2019-11-12 | End: 2019-11-14 | Stop reason: HOSPADM

## 2019-11-12 RX ORDER — PROCHLORPERAZINE MALEATE 5 MG
5 TABLET ORAL EVERY 6 HOURS PRN
Status: DISCONTINUED | OUTPATIENT
Start: 2019-11-12 | End: 2019-11-14 | Stop reason: HOSPADM

## 2019-11-12 RX ORDER — POLYETHYLENE GLYCOL 3350 17 G/17G
17 POWDER, FOR SOLUTION ORAL DAILY PRN
Status: DISCONTINUED | OUTPATIENT
Start: 2019-11-12 | End: 2019-11-14 | Stop reason: HOSPADM

## 2019-11-12 RX ORDER — TRAMADOL HYDROCHLORIDE 50 MG/1
50 TABLET ORAL EVERY 6 HOURS PRN
Status: DISCONTINUED | OUTPATIENT
Start: 2019-11-12 | End: 2019-11-14 | Stop reason: HOSPADM

## 2019-11-12 RX ORDER — AMOXICILLIN 250 MG
1 CAPSULE ORAL 2 TIMES DAILY PRN
Status: DISCONTINUED | OUTPATIENT
Start: 2019-11-12 | End: 2019-11-14 | Stop reason: HOSPADM

## 2019-11-12 RX ORDER — BISACODYL 10 MG
10 SUPPOSITORY, RECTAL RECTAL DAILY PRN
Status: DISCONTINUED | OUTPATIENT
Start: 2019-11-12 | End: 2019-11-14 | Stop reason: HOSPADM

## 2019-11-12 RX ORDER — AMOXICILLIN 250 MG
2 CAPSULE ORAL 2 TIMES DAILY PRN
Status: DISCONTINUED | OUTPATIENT
Start: 2019-11-12 | End: 2019-11-14 | Stop reason: HOSPADM

## 2019-11-12 RX ORDER — POTASSIUM CL/LIDO/0.9 % NACL 10MEQ/0.1L
10 INTRAVENOUS SOLUTION, PIGGYBACK (ML) INTRAVENOUS
Status: DISCONTINUED | OUTPATIENT
Start: 2019-11-12 | End: 2019-11-14 | Stop reason: HOSPADM

## 2019-11-12 RX ORDER — PANTOPRAZOLE SODIUM 40 MG/1
40 TABLET, DELAYED RELEASE ORAL DAILY
Status: ON HOLD | COMMUNITY
End: 2019-11-13

## 2019-11-12 RX ORDER — NALOXONE HYDROCHLORIDE 0.4 MG/ML
.1-.4 INJECTION, SOLUTION INTRAMUSCULAR; INTRAVENOUS; SUBCUTANEOUS
Status: DISCONTINUED | OUTPATIENT
Start: 2019-11-12 | End: 2019-11-14 | Stop reason: HOSPADM

## 2019-11-12 RX ORDER — POTASSIUM CHLORIDE 1.5 G/1.58G
20-40 POWDER, FOR SOLUTION ORAL
Status: DISCONTINUED | OUTPATIENT
Start: 2019-11-12 | End: 2019-11-14 | Stop reason: HOSPADM

## 2019-11-12 RX ORDER — NITROGLYCERIN 0.4 MG/1
0.4 TABLET SUBLINGUAL EVERY 5 MIN PRN
Status: DISCONTINUED | OUTPATIENT
Start: 2019-11-12 | End: 2019-11-14 | Stop reason: HOSPADM

## 2019-11-12 RX ORDER — POTASSIUM CHLORIDE 7.45 MG/ML
10 INJECTION INTRAVENOUS
Status: DISCONTINUED | OUTPATIENT
Start: 2019-11-12 | End: 2019-11-14 | Stop reason: HOSPADM

## 2019-11-12 RX ORDER — POTASSIUM CHLORIDE 29.8 MG/ML
20 INJECTION INTRAVENOUS
Status: DISCONTINUED | OUTPATIENT
Start: 2019-11-12 | End: 2019-11-14 | Stop reason: HOSPADM

## 2019-11-12 RX ADMIN — POTASSIUM CHLORIDE AND SODIUM CHLORIDE: 900; 150 INJECTION, SOLUTION INTRAVENOUS at 18:42

## 2019-11-12 RX ADMIN — PANTOPRAZOLE SODIUM 40 MG: 40 INJECTION, POWDER, FOR SOLUTION INTRAVENOUS at 14:34

## 2019-11-12 RX ADMIN — SODIUM CHLORIDE, POTASSIUM CHLORIDE, SODIUM LACTATE AND CALCIUM CHLORIDE 1000 ML: 600; 310; 30; 20 INJECTION, SOLUTION INTRAVENOUS at 15:31

## 2019-11-12 ASSESSMENT — ENCOUNTER SYMPTOMS
CHILLS: 1
ANAL BLEEDING: 1
NAUSEA: 1
FREQUENCY: 0
FEVER: 0
VOMITING: 1
FATIGUE: 1
ABDOMINAL PAIN: 0
APPETITE CHANGE: 1
LIGHT-HEADEDNESS: 1
ROS GI COMMENTS: DARK STOOLS.

## 2019-11-12 ASSESSMENT — MIFFLIN-ST. JEOR: SCORE: 1093.24

## 2019-11-12 NOTE — PROGRESS NOTES
RECEIVING UNIT ED HANDOFF REVIEW    ED Nurse Handoff Report was reviewed by: Qi Bill RN on November 12, 2019 at 4:10 PM

## 2019-11-12 NOTE — ED NOTES
"[facility]  ED Nurse Handoff Report    ED Chief complaint: Rectal Bleeding      ED Diagnosis:   Final diagnoses:   Melena   Gastrointestinal hemorrhage, unspecified gastrointestinal hemorrhage type   Dizziness   Chronic anticoagulation - Eliquis       Code Status: to be determined by Hospitalist     Allergies:   Allergies   Allergen Reactions     Dilaudid [Hydromorphone] Nausea and Vomiting     Morphine Nausea and Vomiting     Simvastatin GI Disturbance     Xarelto [Rivaroxaban] Other (See Comments)     Profuse bleeding       Activity level - Baseline/Home:  Independent  Activity Level - Current:   Unable to Assess not assessed in the ED      Patient's Preferred language: Eng    Needed?: No    Isolation: No  Infection: Not Applicable  Bariatric?: No    Vital Signs:   Vitals:    11/12/19 1316 11/12/19 1400   BP: 103/47    Resp: 16 22   Temp: 98.1  F (36.7  C)    TempSrc: Oral    SpO2: 99% 96%   Weight: 64.4 kg (142 lb)    Height: 1.6 m (5' 3\")        Cardiac Rhythm: ,        Pain level:      Is this patient confused?: No   Does this patient have a guardian?  No         If yes, is there guardianship documents in the Epic \"Code/ACP\" activity?  N/A         Guardian Notified?  N/A  [place] - Suicide Severity Rating Scale Completed?  Yes  If yes, what color did the patient score?  White    Patient Report: Initial Complaint: pale, lots of output on recent surgery site and bloody ( dark per pt 0 stool .   Focused Assessment: alert calm and pleasant vitally stable female.   Tests Performed: labs monitored   Abnormal Results:   Abnormal Labs Reviewed   CBC WITH PLATELETS DIFFERENTIAL - Abnormal; Notable for the following components:       Result Value    RBC Count 2.77 (*)     Hemoglobin 8.0 (*)     Hematocrit 24.9 (*)     All other components within normal limits   INR - Abnormal; Notable for the following components:    INR 1.80 (*)     All other components within normal limits   COMPREHENSIVE " METABOLIC PANEL - Abnormal; Notable for the following components:    Glucose 122 (*)     Urea Nitrogen 38 (*)     GFR Estimate 52 (*)     Calcium 8.3 (*)     Albumin 3.2 (*)     Protein Total 5.8 (*)     All other components within normal limits   OCCULT BLOOD STOOL - Abnormal; Notable for the following components:    Occult Blood Positive (*)     All other components within normal limits     Treatments provided: admission     Family Comments:  present and updated     OBS brochure/video discussed/provided to patient/family: N/A              Name of person given brochure if not patient: na              Relationship to patient: na    ED Medications:   Medications   lactated ringers BOLUS 1,000 mL (has no administration in time range)   pantoprazole (PROTONIX) 40 mg IV push injection (40 mg Intravenous Given 11/12/19 6974)       Drips infusing?:  No    For the majority of the shift this patient was Green.   Interventions performed were monitored ivf protonix admission .    Severe Sepsis OR Septic Shock Diagnosis Present: No    To be done/followed up on inpatient unit:  monitor for bleeding and vitals     ED NURSE PHONE NUMBER: [phone]

## 2019-11-12 NOTE — ED PROVIDER NOTES
History     Chief Complaint:  Rectal Bleeding    The history is provided by the patient and the spouse.      Suzi Amaya is a 78 year old female, with history of hypertension, hyperlipidemia, hemorrhoids, nonischemic cardiomyopathy amongst others as noted below, currently anticoagulated on Eliquis, who presents with her spouse for evaluation of rectal bleeding and noted melena today. Patient reports she did go off her Eliquis prior to her procedure for her claw toe 4 days ago at [facility], but resumed it the day after surgery as no one told her she couldn't. Spouse notes she was doing fine, but three days ago, noticed some wetness around her cast. This continued to worsen throughout the weekend. Yesterday, patient states that she had her first bowel movement since the surgery and noticed some rectal bleeding and dark stools, though not black stool. Patient states that this continued throughout the night and she was evaluated at [facility] for a wound check prior to arrival, where she mentioned her symptoms. They replaced her bloody bandages and recommended she present.     Here, patient notes she was lightheaded this morning. She endorses some back pain and notes that she has had 3-4 episodes of rectal bleeding, though only when having a bowel movement. She states she did take her dose of Plavix this morning. Spouse states patient has been more fatigued, nauseated and decreased appetite. Patient also notes she vomited this morning. She states she has some chills, but denies any fever, frequency, urgency, history of diverticulitis, abdominal surgeries, falls or current abdominal pain. Of note, patient is on stool softeners and notes she has been constipated initially, but they have become looser.     Allergies:  Dilaudid  Morphine  Simvastatin  Xarelto     Medications:    Albuterol inhaler  Eliquis  Fiorinal  Carvedilol  Losartan  Zofran  Omeprazole  Senna-docusate  Tramadol  Maxzide    Past Medical History:   "  Arthritis  Complication of anesthesia  GERD  Hyperlipidemia  Hypertension  Irregular heart beat  Left bundle branch block  Migraine  Nonischemic cardiomyopathy  Sinus infection  Asthma  Paroxysmal atrial fibrillation    Past Surgical History:    Arthroplasty revision knee - left x2  ENT surgery - T & A  D & C  Tubal ligation  Orthopedic surgery - right knee scope    Family History:    History reviewed. No pertinent family history.     Social History:  The patient was accompanied to the ED by spouse  Smoking Status: No  Smokeless Tobacco: No  Alcohol Use: Yes  Drug Use: No   Marital Status:   [2]     Review of Systems   Constitutional: Positive for appetite change, chills and fatigue. Negative for fever.   Gastrointestinal: Positive for anal bleeding, nausea and vomiting. Negative for abdominal pain.        Dark stools.    Genitourinary: Negative for frequency and urgency.   Neurological: Positive for light-headedness.   All other systems reviewed and are negative.        Physical Exam   Vitals:  Patient Vitals for the past 24 hrs:   BP Temp Temp src Pulse Heart Rate Resp SpO2 Height Weight   11/12/19 1810 132/59 -- -- 81 80 18 97 % -- --   11/12/19 1700 -- -- -- 77 74 20 -- -- --   11/12/19 1600 (!) 141/65 -- -- 73 86 12 -- -- --   11/12/19 1530 115/59 -- -- 90 86 14 93 % -- --   11/12/19 1400 -- -- -- -- 82 22 96 % -- --   11/12/19 1316 103/47 98.1  F (36.7  C) Oral -- 86 16 99 % 1.6 m (5' 3\") 64.4 kg (142 lb)        Physical Exam  Physical Exam   Constitutional: Pt appears well-developed and well-nourished. Non toxic appearing.   Head: Head moves freely with normal range of motion.   ENT: Oropharynx is clear and moist.   Eyes: Conjunctivae pink. EOMs intact. No scleral icterus.   Neck: Normal range of motion.    Cardiovascular: Regular rate and rhythm. Normal heart sounds. No concerning murmur.  Pulmonary/Chest: No respiratory distress. No decreased breath sounds. No wheezes. No rhonchi. No rales. "   Abdominal: Soft. Mild epigastric tenderness. No rebound, no guarding. No CVA tenderness. No pain over McBurney's point. Negative Crow's sign.   Rectal: no bright red blood at the rectum. Melena on gloved finger. No obvious fissures or bleeding hemorrhoids noted.   Musculoskeletal: No peripheral edema. Distal capillary refill and sensation intact.  Neurological: Oriented to person, place, and time. No focal deficits.   Skin: Skin is warm and pale in color. No rash noted.      Emergency Department Course     Laboratory:  Laboratory findings were communicated with the patient and family who voiced understanding of the findings.  CBC: HGB 8.0 (L) o/w WNL (WBC 10.3, )  CMP: Glucose 122 (H), Bun 38 (H), GFR Estimate 52 (L), Calcium 8.3 (L), Albumin 3.2 (L), Protein Total 5.8 (L) o/w WNL (Creatinine 1.02)  INR: 1.80 (H)  ABO/Rh Type and Screen: ABO O, Rh(D) Pos, Antibody Screen Neg     Occult blood stool: Positive (A)    Interventions:  1434 Protonix 40 mg IV  1531 Lactated Ringers Bolus 1000 mL IV     Emergency Department Course:  Nursing notes and vitals reviewed.  IV was inserted and blood was drawn for laboratory testing, results above.  A stool sample was collected and underwent laboratory testing, findings above.     (1352)   I performed an exam of the patient as documented above. History obtained from patient and spouse.    (1422)   Rectal exam was performed here in the emergency department. This was performed with female chaperone at bedside. Discussed results and plan of care and patient is agreeable to admission.     (1533)   I spoke with Dr. Davis of the Hospitalist service regarding patient's presentation, findings, and plan of care, who agrees to accept patient for further care.     Findings and plan explained to the Patient and spouse who consents to admission. Discussed the patient with Dr. Davis, who will admit the patient to an [facility] bed for further monitoring, evaluation, and treatment.     I  personally reviewed the laboratory results with the Patient and spouse and answered all related questions prior to admission.    Impression & Plan      Medical Decision Making:  Suzi Amaya is a 78 year old female who presents with dark stools and associated lightheadedness and dizziness for the last day. Patient is currently anticoagulated on Eliquis. She had stopped this 3 days prior to her foot surgery on 11/8 and restarted on 11/9. She noted dark stool yesterday and continued today, no with dizziness. Rectal exam, occult stool, anemia Hgb 8.0 and elevated BUN are consistent with an upper gastrointestinal bleed. Protonix IV given. Type and crossmatch sent. She is not actively bleeding here and her vital signs including orthostatic vitals are normal. For these reasons I did not reverse her Eliquis. She is at high risk for worsening bleeding which is why she will be admitted to [facility]. She remains stable in the ED. Dr Davis admitting. Patient and her  are amenable to plan.     Diagnosis:    ICD-10-CM    1. Melena K92.1 ABO/Rh type and screen     Occult blood stool (ED Lab POCT Only 3-11)   2. Gastrointestinal hemorrhage, unspecified gastrointestinal hemorrhage type K92.2    3. Dizziness R42    4. Chronic anticoagulation Z79.01     Eliquis        Disposition:   Admission.    Scribe Disclosure:  I, Mitzi Shaver, am serving as a scribe at 1:42 PM on 11/12/2019 to document services personally performed by Charlotte Richard*, based on my observations and the provider's statements to me.  11/12/2019    EMERGENCY DEPARTMENT       Charlotte Richard APRN CNP  11/12/19 0527

## 2019-11-12 NOTE — H&P
"[facility]    History and Physical - Hospitalist Service       Date of Admission:  11/12/2019    Assessment & Plan   Suzi Amaya is a 78 year old female who is on Eliquis for paroxysmal atrial fibrillation, GERD and underwent foot surgery on 11/12/2019, resuming Eliquis the day after surgery. She took one naproxen the day of presentation, but otherwise denies NSAID's or ETOH use. She has no history of PUD. EGD on 9/2014 showed nl esophagus, stomach and duodenum.     Hgb at presentation is 9.3 with nl MCV, last hgb 10.2 on 5/30/2018 and all hgb's prior were around 10. T 98.1, 86, 103/47, 16, 99% on RA.  BUN up at 38, Cr 1.02.     Melena with acute blood loss anemia - Hgb 9.3 (10.2 in 5/018) presents with melena, light-headedness. I suspect source is upper GI.   Anticoagulation for Afib, on Elquis  - 2 IV's will be placed in ER, s/p 1 L LR bolus with normal hemodynamics. Therefore, no reversal agents will be given at this point.   - Admit to IMC on telemetry with q 2 hour vitals initially.   - She is type and screened   - Recheck hgb on arrival to floor  - Consented for blood with conditional orders for hgb under 7  - Protonix started in ED. Continue at 40 mg IV BID.   - GI will be consulted and she will be NPO after midnight for probable EGD in the AM.     Hypertension   Mild KAILASH, prerenal secondary to above  - Hold PTA losartan, triamterene/HCTZ, as well as coreg as long as she stays in NSR      Paroxysmal Afib, currently in NSR  LBBB   Anticoagulation for afib, on Apixaban as above, CHADSVASc 4.   - Holding coreg and Apixaban  - telemetry for first 24 hours, and until no further signs of active bleeding.     S/p foot surgery for claw toe of L foot. - She was juts in to see Dr. Rubio and had dressing changed and boot replaced on day of admission in podiatry clinic.   - Podiatry consulted to follow wound. Per patient there was a lot of \"pink drainage\" but she ws informed there was no evidence " of infection or active bleeding and there is no significant pain or fevers.    - Percocet caused nausea. We will continue PTA tramadol and tylenol for pain.      Diet: Regular, than NPO after midnight.   DVT Prophylaxis: Pneumatic Compression Devices and ambulate as able.   Rawls Catheter: not present  Code Status: Full     Disposition Plan   Expected discharge: 2 days, recommended to prior living arrangement once GI bleeding workup completed and patient is deemed low risk for rebleed. .  Entered: Rhea Davis MD 11/12/2019, 3:38 PM     The patient's care was discussed with the Bedside Nurse, Patient and Patient's Family.    Rhea Davis MD  [facility]    ______________________________________________________________________    Chief Complaint    Light-headed, weak, low hgb.     History is obtained from the patient    History of Present Illness   Suzi Amaya is a 78 year old female who is on Eliquis for paroxysmal atrial fibrillation, GERD and underwent foot surgery on 11/12/2019, resuming Eliquis the day after surgery. She took one naproxen the day of presentation, but otherwise denies NSAID's or ETOH use. She has no history of PUD. EGD on 9/2014 showed nl esophagus, stomach and duodenum.     Hgb at presentation is 9.3 with nl MCV, last hgb 10.2 on 5/30/2018 and all hgb's prior were around 10. T 98.1, 86, 103/47, 16, 99% on RA.  BUN up at 38, Cr 1.02.     Tamika states that starting Sunday 2 days prior to presentation she has not been feeling good.  She woke up feeling very fatigued.  Was not able to go to [employer] and instead had to just sleep the morning.  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  But the next day she woke up again feeling very fatigued with low energy and lightheadedness.  The morning of presentation she felt worse than before with extreme lightheadedness.  She took one ibuprofen.  And this morning passed a large  amounts of black sticky stool.  She denies taking any other NSAIDs.  She is only been taking Percocet and stopped taking that because of nausea.  She rarely drinks and has had no recent alcohol.  She has a history of GERD but has had no increase in symptoms with this.  She denies any significant abdominal pain or trouble swallowing.  When she vomited from Percocet the vomit was nonbloody.     She denies any history of stroke.  She has been on Eliquis for many years without any difficulties with bleeding.  Prior to this she was on Xarelto and had a lot of bruising so that is why she switched to Eliquis.    Review of Systems    The 10 point Review of Systems is negative other than noted in the HPI or above.     Past Medical History    Paroxysmal Atrial Fibrillation   - Echo 5/2018 Mild LVH with EF 55%, grade I diastolic dysfxn. (In Care Everywhere)  Osteoarthritis  GERD  HTN  HLD  LBBB  Asthma with allergies   H/o migraines     Past Surgical History   I have reviewed this patient's surgical history and updated it with pertinent information if needed.  Past Surgical History:   Procedure Laterality Date     ARTHROPLASTY REVISION KNEE Left 12/18/2017    Procedure: ARTHROPLASTY REVISION KNEE;  LEFT TOTAL KNEE POLYETHYLENE EXCHANGE   ;  Surgeon: Blu Mitchell MD;  Location: [facility] OR     ARTHROPLASTY REVISION KNEE Left 5/29/2018    Procedure: ARTHROPLASTY REVISION KNEE;  Revision left total knee arthroplasty   ;  Surgeon: Fab Ennis MD;  Location: [facility] OR     ENT SURGERY      T&A     GYN SURGERY      D&C, tubal ligation     ORTHOPEDIC SURGERY  2005    right knee scope, RTK(eventually)     ORTHOPEDIC SURGERY  2005    left total knee       Social History   She is a professional flutist.  She is  and her  accompanies her to this appointment. She occasionally drinks alcohol, never in excess.   Social History     Tobacco Use     Smoking status: Never Smoker     Smokeless tobacco: Never Used   Substance Use  Topics     Alcohol use: Yes     Comment: 2 glasses of wine per week     Drug use: No       Family History   She recalls her mother having stomach ulcers.  No other history of GI disease in the family that she is aware of.      Prior to Admission Medications   Prior to Admission Medications   Prescriptions Last Dose Informant Patient Reported? Taking?   Apixaban (ELIQUIS PO) 2019 at am Self Yes Yes   Sig: Take 5 mg by mouth 2 times daily   CARVEDILOL PO 2019 at Unknown time Self Yes Yes   Sig: Take 12.5 mg by mouth 2 times daily   COMPRESSION STOCKINGS   No No   Si each continuous   Cyanocobalamin (B-12 PO) 2019 at Unknown time Self Yes Yes   Sig: Take 1,000 mcg by mouth daily    LOSARTAN POTASSIUM PO 2019 at hs Self Yes Yes   Sig: Take 100 mg by mouth At Bedtime   VITAMIN D, CHOLECALCIFEROL, PO 2019 at Unknown time Self Yes Yes   Sig: Take 2,000 Units by mouth daily (with dinner)    acetaminophen (TYLENOL) 325 MG tablet   No Yes   Sig: Take 1 tablet (325 mg) by mouth every 4 hours as needed for mild pain or fever   albuterol (PROAIR HFA/PROVENTIL HFA/VENTOLIN HFA) 108 (90 BASE) MCG/ACT Inhaler  Self Yes Yes   Sig: Inhale 1-2 puffs into the lungs every 4 hours as needed for shortness of breath / dyspnea or wheezing   butalbital-aspirin-caffeine (FIORINAL) -40 MG per capsule  Self Yes Yes   Sig: Take 1 capsule by mouth 2 times daily as needed for headaches    multivitamin, therapeutic with minerals (THERA-VIT-M) TABS tablet 2019 at am Self Yes Yes   Sig: Take 1 tablet by mouth daily   ondansetron (ZOFRAN-ODT) 4 MG ODT tab   No Yes   Sig: Take 1 tablet (4 mg) by mouth every 6 hours as needed for nausea or vomiting   pantoprazole (PROTONIX) 40 MG EC tablet 2019 at 1700  Yes Yes   Sig: Take 40 mg by mouth daily   traMADol (ULTRAM) 50 MG tablet   No Yes   Sig: Take 1 tablet (50 mg) by mouth every 6 hours as needed for severe pain   triamterene-hydrochlorothiazide  (MAXZIDE-25) 37.5-25 MG per tablet 11/12/2019 at Unknown time Self Yes Yes   Sig: Take 1 tablet by mouth every morning      Facility-Administered Medications: None     Allergies   Allergies   Allergen Reactions     Dilaudid [Hydromorphone] Nausea and Vomiting     Morphine Nausea and Vomiting     Simvastatin GI Disturbance     Xarelto [Rivaroxaban] Other (See Comments)     Profuse bleeding       Physical Exam   Vital Signs: Temp: 98.1  F (36.7  C) Temp src: Oral BP: 103/47   Heart Rate: 82 Resp: 22 SpO2: 96 % O2 Device: None (Room air)    Weight: 142 lbs 0 oz    Constitutional:   awake, alert, cooperative, no apparent distress, and appears stated age     Eyes:   Lids and lashes normal, pupils equal, round and reactive to light, extra ocular muscles intact, sclera clear, conjunctiva normal     ENT:   Normocephalic, without obvious abnormality, atramatic, oral pharynx with moist mucus membranes, tonsils without erythema or exudates.      Neck:   Supple, symmetrical, trachea midline, no adenopathy, thyroid symmetric, not enlarged and no tenderness, skin normal     Lungs:   No increased work of breathing, good air exchange, clear to auscultation bilaterally, no crackles or wheezing     Cardiovascular:   Regular rate and rhythm, normal S1 and S2, no S3 or S4, and no murmur noted. Extremities are warm. No edema.      Abdomen:   Normal bowel sounds, soft, non-distended, non-tender, no masses palpated, no hepatosplenomegally. Rectal exam in ED showed melena.      Musculoskeletal:   There is no redness, warmth, or swelling of the joints. Normal build. Boot on left leg.      Neurologic:   Awake, alert, oriented to name, place and time.  Cranial nerves II-XII are grossly intact.  Moving a 4 extremities without gross focal weakness.     Neuropsychiatric:   General: normal, calm and normal eye contact     Skin:   no bruising or bleeding, no redness, warmth, or swelling and no rashes       Data   Data reviewed today: I reviewed  all medications, new labs and imaging results over the last 24 hours. I personally reviewed no images or EKG's today.    Recent Labs   Lab 11/12/19  1343   WBC 10.3   HGB 8.0*   MCV 90      INR 1.80*      POTASSIUM 3.8   CHLORIDE 102   CO2 27   BUN 38*   CR 1.02   ANIONGAP 9   JOSÉ MIGUEL 8.3*   *   ALBUMIN 3.2*   PROTTOTAL 5.8*   BILITOTAL 0.5   ALKPHOS 43   ALT 17   AST 14     No results found for this or any previous visit (from the past 24 hour(s)).

## 2019-11-13 LAB
ABO + RH BLD: NORMAL
ABO + RH BLD: NORMAL
ANION GAP SERPL CALCULATED.3IONS-SCNC: 3 MMOL/L (ref 3–14)
BLD GP AB SCN SERPL QL: NORMAL
BLD PROD TYP BPU: NORMAL
BLD PROD TYP BPU: NORMAL
BLD UNIT ID BPU: 0
BLOOD BANK CMNT PATIENT-IMP: NORMAL
BLOOD PRODUCT CODE: NORMAL
BPU ID: NORMAL
BUN SERPL-MCNC: 27 MG/DL (ref 7–30)
CALCIUM SERPL-MCNC: 8 MG/DL (ref 8.5–10.1)
CHLORIDE SERPL-SCNC: 112 MMOL/L (ref 94–109)
CO2 SERPL-SCNC: 26 MMOL/L (ref 20–32)
CREAT SERPL-MCNC: 0.8 MG/DL (ref 0.52–1.04)
GFR SERPL CREATININE-BSD FRML MDRD: 71 ML/MIN/{1.73_M2}
GLUCOSE SERPL-MCNC: 102 MG/DL (ref 70–99)
HCT VFR BLD AUTO: 22.7 % (ref 35–47)
HGB BLD-MCNC: 7.5 G/DL (ref 11.7–15.7)
HGB BLD-MCNC: 7.6 G/DL (ref 11.7–15.7)
HGB BLD-MCNC: 7.9 G/DL (ref 11.7–15.7)
INR PPP: 1.47 (ref 0.86–1.14)
NUM BPU REQUESTED: 1
POTASSIUM SERPL-SCNC: 3.6 MMOL/L (ref 3.4–5.3)
SODIUM SERPL-SCNC: 141 MMOL/L (ref 133–144)
SPECIMEN EXP DATE BLD: NORMAL
TRANSFUSION STATUS PATIENT QL: NORMAL
TRANSFUSION STATUS PATIENT QL: NORMAL
UPPER GI ENDOSCOPY: NORMAL

## 2019-11-13 PROCEDURE — 36415 COLL VENOUS BLD VENIPUNCTURE: CPT | Performed by: INTERNAL MEDICINE

## 2019-11-13 PROCEDURE — 85018 HEMOGLOBIN: CPT | Performed by: HOSPITALIST

## 2019-11-13 PROCEDURE — G0500 MOD SEDAT ENDO SERVICE >5YRS: HCPCS | Performed by: INTERNAL MEDICINE

## 2019-11-13 PROCEDURE — 87338 HPYLORI STOOL AG IA: CPT | Performed by: INTERNAL MEDICINE

## 2019-11-13 PROCEDURE — 85610 PROTHROMBIN TIME: CPT | Performed by: NURSE PRACTITIONER

## 2019-11-13 PROCEDURE — C9113 INJ PANTOPRAZOLE SODIUM, VIA: HCPCS | Performed by: INTERNAL MEDICINE

## 2019-11-13 PROCEDURE — 25000125 ZZHC RX 250: Performed by: INTERNAL MEDICINE

## 2019-11-13 PROCEDURE — 36415 COLL VENOUS BLD VENIPUNCTURE: CPT | Performed by: HOSPITALIST

## 2019-11-13 PROCEDURE — 85014 HEMATOCRIT: CPT | Performed by: HOSPITALIST

## 2019-11-13 PROCEDURE — 25800030 ZZH RX IP 258 OP 636: Performed by: HOSPITALIST

## 2019-11-13 PROCEDURE — 25000128 H RX IP 250 OP 636: Performed by: INTERNAL MEDICINE

## 2019-11-13 PROCEDURE — 12000000 ZZH R&B MED SURG/OB

## 2019-11-13 PROCEDURE — 99232 SBSQ HOSP IP/OBS MODERATE 35: CPT | Performed by: HOSPITALIST

## 2019-11-13 PROCEDURE — 43235 EGD DIAGNOSTIC BRUSH WASH: CPT | Performed by: INTERNAL MEDICINE

## 2019-11-13 PROCEDURE — 0DJ08ZZ INSPECTION OF UPPER INTESTINAL TRACT, VIA NATURAL OR ARTIFICIAL OPENING ENDOSCOPIC: ICD-10-PCS | Performed by: INTERNAL MEDICINE

## 2019-11-13 PROCEDURE — P9016 RBC LEUKOCYTES REDUCED: HCPCS | Performed by: NURSE PRACTITIONER

## 2019-11-13 PROCEDURE — 80048 BASIC METABOLIC PNL TOTAL CA: CPT | Performed by: INTERNAL MEDICINE

## 2019-11-13 PROCEDURE — 85018 HEMOGLOBIN: CPT | Performed by: INTERNAL MEDICINE

## 2019-11-13 PROCEDURE — 36415 COLL VENOUS BLD VENIPUNCTURE: CPT | Performed by: NURSE PRACTITIONER

## 2019-11-13 PROCEDURE — 25000128 H RX IP 250 OP 636: Performed by: HOSPITALIST

## 2019-11-13 PROCEDURE — 25000132 ZZH RX MED GY IP 250 OP 250 PS 637: Performed by: HOSPITALIST

## 2019-11-13 RX ORDER — ONDANSETRON 2 MG/ML
INJECTION INTRAMUSCULAR; INTRAVENOUS PRN
Status: DISCONTINUED | OUTPATIENT
Start: 2019-11-13 | End: 2019-11-13 | Stop reason: HOSPADM

## 2019-11-13 RX ORDER — OMEPRAZOLE 40 MG/1
CAPSULE, DELAYED RELEASE ORAL
Qty: 60 CAPSULE | Refills: 3 | Status: SHIPPED | OUTPATIENT
Start: 2019-11-13

## 2019-11-13 RX ORDER — CARVEDILOL 12.5 MG/1
12.5 TABLET ORAL 2 TIMES DAILY
Status: DISCONTINUED | OUTPATIENT
Start: 2019-11-13 | End: 2019-11-14 | Stop reason: HOSPADM

## 2019-11-13 RX ORDER — LIDOCAINE 40 MG/G
CREAM TOPICAL
Status: DISCONTINUED | OUTPATIENT
Start: 2019-11-13 | End: 2019-11-13 | Stop reason: HOSPADM

## 2019-11-13 RX ADMIN — PANTOPRAZOLE SODIUM 40 MG: 40 INJECTION, POWDER, FOR SOLUTION INTRAVENOUS at 18:22

## 2019-11-13 RX ADMIN — IRON SUCROSE 300 MG: 20 INJECTION, SOLUTION INTRAVENOUS at 16:56

## 2019-11-13 RX ADMIN — POTASSIUM CHLORIDE AND SODIUM CHLORIDE: 900; 150 INJECTION, SOLUTION INTRAVENOUS at 06:02

## 2019-11-13 RX ADMIN — CARVEDILOL 12.5 MG: 12.5 TABLET, FILM COATED ORAL at 21:05

## 2019-11-13 RX ADMIN — PANTOPRAZOLE SODIUM 40 MG: 40 INJECTION, POWDER, FOR SOLUTION INTRAVENOUS at 06:02

## 2019-11-13 RX ADMIN — POTASSIUM CHLORIDE AND SODIUM CHLORIDE 100 ML/HR: 900; 150 INJECTION, SOLUTION INTRAVENOUS at 08:13

## 2019-11-13 ASSESSMENT — ACTIVITIES OF DAILY LIVING (ADL): ADLS_ACUITY_SCORE: 10

## 2019-11-13 ASSESSMENT — MIFFLIN-ST. JEOR: SCORE: 1115.92

## 2019-11-13 NOTE — PROGRESS NOTES
GI Brief Note    EGD completed - please see full report under Procedures tab.    3mg versed and spray. No fentanyl used due to patient intolerance.    Findings:  - medium to large gastric antral ulcer  - medium hiatal hernia    A/P:  The ulcer is likely the source of the anemia. She had been taking PPI but irregularly. I wonder if she had NSAID use in the past that contributed.    - liquids only today, check stool h.pylori antigetn  - continue IV BID PPI today   - would not restart eliquis today, can restart tomorrow if she remains stable  - complete avoidance of NSAIDs  - at discharge omeprazole 40mg BID (before breakfast and before dinner) for 8 weeks then 40mg once daily thereafter  - repeat EGD in 8 weeks to ensure ulcer is healing    Senia Cohn MD  [facility]  Cell/Pager: [phone]  After 5pm please call [phone]

## 2019-11-13 NOTE — PROGRESS NOTES
[facility]  Medicine Progress Note - Hospitalist Service       Date of Admission:  11/12/2019  Assessment & Plan   Suzi Amaya is a 78 year old female who is on Eliquis for paroxysmal atrial fibrillation, GERD and underwent foot surgery on 11/12/2019, resuming Eliquis the day after surgery. She took one naproxen the day of presentation, but otherwise denies NSAID's or ETOH use. She has no history of PUD. EGD on 9/2014 showed nl esophagus, stomach and duodenum.      Hgb at presentation is 9.3 with nl MCV, last hgb 10.2 on 5/30/2018 and all hgb's prior were around 10. T 98.1, 86, 103/47, 16, 99% on RA.  BUN up at 38, Cr 1.02.      Melena with acute blood loss anemia  Non bleeding medium to large gastric antral ulcer  Anticoagulation for Afib, on Elquis  Hb was 10.2 in 5/018.  Resented with 9.3.  Reported melena, light-headedness.  Suspected upper GI bleed.  No NSAIDs use.  On apixaban as above.  Received IV hydration in ER.  Hemodynamically stable.  Was admitted to [facility].  PPI drip was restarted.  -Hb dropped to 6.2, received 1 unit PRBC, appropriate rise in Hb posttransfusion.  -s/p EGD this a.m. medium to large gastric antral ulcer, nonbleeding noted.  Medium hiatal hernia noted.  Stool H. pylori antigen ordered by GI.  -Continue to follow H&H every 6, transfuse for Hb<7.   - PPI IV twice daily and clear liquid diet per GI. Omeprazole 40 mg p.o. twice daily for 8 weeks then daily thereafter per GI.  Needs repeat EGD in 8 weeks to ensure ulcer is healing.  -Will give IV iron x2 while in hospital.  -Okay to restart apixaban in 1-2 days, discussed with GI.   - GI will be consulted and she will be NPO after midnight for probable EGD in the AM.      Hypertension   Mild KAILASH, prerenal secondary to above  - Hold PTA losartan, triamterene/HCTZ, as well as coreg as long as she stays in NSR      Paroxysmal Afib, currently in NSR  LBBB   Anticoagulation for afib, on Apixaban as above, CHADSVASc 4.   - PTA  "coreg and Apixaban held on admission.  -DC telemetry since posttransfusion Hb is a stable.  -Resume Coreg.  -We will resume apixaban in 1-2 days, will.     S/p foot surgery for claw toe of L foot. - She was juts in to see Dr. Rubio and had dressing changed and boot replaced on day of admission in podiatry clinic.   - Podiatry consulted to follow wound. Per patient there was a lot of \"pink drainage\" but she ws informed there was no evidence of infection or active bleeding and there is no significant pain or fevers.    - Percocet caused nausea.  continue PTA tramadol and tylenol for pain.       Diet: Full Liquid Diet    DVT Prophylaxis: Pneumatic Compression Devices  Rawsl Catheter: not present  Code Status: Full Code      Disposition Plan   Expected discharge: Tomorrow, recommended to prior living arrangement once Hb remains stable.  Discontinue IMC status.  Entered: Eduar Diaz MD 11/13/2019, 3:06 PM       The patient's care was discussed with the Patient and GI Consultant.    Eduar Diaz MD  Hospitalist Service  [facility]    ____________________________________________________________________    Interval History   Patient was seen and evaluated, Hb was 8 at presentation, dropped to 6.2, and received 1 unit PRBC last night.  Hemoglobin has come up to 7.9.  Denies epigastric abdominal pain or nausea.  -s/p EGD this a.m., report reviewed, medium to large gastric antral ulcer was noted.    Data reviewed today: I reviewed all medications, new labs and imaging results over the last 24 hours. I personally reviewed no images or EKG's today.    Physical Exam   Vital Signs: Temp: 98.3  F (36.8  C) Temp src: Oral BP: 133/54 Pulse: 61 Heart Rate: 61 Resp: 17 SpO2: 98 % O2 Device: None (Room air)    Weight: 147 lbs 0 oz    General: AAOx3, appears comfortable.  HEENT: PERRLA EOMI. Mucosa moist.   Lungs: Bilateral equal air entry. Clear to auscultation, normal work of breathing.   CVS: S1S2 " regular, no tachycardia or murmur.   Abdomen: Soft, NT, ND. BS heard.  MSK: No edema or deformities.  Neuro: AAOX3. CN 2-12 normal. Strength symmetrical.  Skin: No rash. Very pale.     Data   Recent Labs   Lab 11/13/19  1211 11/13/19  0914 11/13/19  0529 11/12/19  2349  11/12/19  1343   WBC  --   --   --   --   --  10.3   HGB 7.9*  --  7.5* 6.2*   < > 8.0*   MCV  --   --   --   --   --  90   PLT  --   --   --   --   --  259   INR  --  1.47*  --   --   --  1.80*   NA  --   --  141  --   --  138   POTASSIUM  --   --  3.6  --   --  3.8   CHLORIDE  --   --  112*  --   --  102   CO2  --   --  26  --   --  27   BUN  --   --  27  --   --  38*   CR  --   --  0.80  --   --  1.02   ANIONGAP  --   --  3  --   --  9   JOSÉ MIGUEL  --   --  8.0*  --   --  8.3*   GLC  --   --  102*  --   --  122*   ALBUMIN  --   --   --   --   --  3.2*   PROTTOTAL  --   --   --   --   --  5.8*   BILITOTAL  --   --   --   --   --  0.5   ALKPHOS  --   --   --   --   --  43   ALT  --   --   --   --   --  17   AST  --   --   --   --   --  14    < > = values in this interval not displayed.     No results found for this or any previous visit (from the past 24 hour(s)).  Medications     0.9% sodium chloride + KCl 20 mEq/L 100 mL/hr (11/13/19 0813)       iron sucrose (VENOFER) intermittent infusion  300 mg Intravenous Daily     pantoprazole (PROTONIX) IV  40 mg Intravenous Q12H

## 2019-11-13 NOTE — PROGRESS NOTES
PRE-PROCEDURE NOTE    CHIEF COMPLAINT / REASON FOR PROCEDURE:  Dark stools, anemia    History and Physical Reviewed:  Yes n    PRE-SEDATION ASSESSMENT:    Lung Exam:  normal  Heart Exam:  normal  Mallampati Airway Classification:  3   Previous reaction to anesthesia/sedation:   Nausea with opiates  Sedation plan based on assessment:  moderate  Comment(s):  Risks explained  ASA Classification:  3    IMPRESSION:  Rule out upper GI bleeding    PLAN:  egd    Senia Cohn MD, MD

## 2019-11-13 NOTE — PLAN OF CARE
VSS. Monitor remains Sinus rhythm with BBB. Pt. Had 2 dark loose stools today. Pt. Denies pain. Pt. Tolerated   Full liquid diet well. Plan for discharge tomorrow. Continue to monitor.

## 2019-11-13 NOTE — PLAN OF CARE
Assumed cares from 1800 (time of admission) - 1930. Pt A&O x4. Ambulates with assist x1 due to unsteadiness with left foot walking boot. Tele: sinus rhythm, BBB, and 1st degree AVB. Endorses mild dizziness. Denies pain, SOB, numbness/tingling. IVF infusing per MAR. Pt reports very small BM ~ 1830, pt flushed before RN was able to visualize, per pt report was still bloody. Skin pale in color. VSS, on RA.     Adriana Solano RN on 11/12/2019 at 7:28 PM

## 2019-11-13 NOTE — PLAN OF CARE
Pt alert, SBA with ortho boot. Denies pain VSS Tele SR BBB. No BM, hgb to 6.2,asymptomatic, transfused 1 unit PRBC.Recheck hgb 7.5. GI consult, NPO, Review POC with patient.

## 2019-11-13 NOTE — CONSULTS
GASTROENTEROLOGY CONSULTATION     Suzi Amaya   [address] [facility] [address]   78 year old female   Admission Date/Time: 11/12/2019   Encounter Date: 11/13/2019  Primary Care Provider: Gilson Mccollum     We were asked to see the patient in consultation by Dr. Davis for evaluation of melena.     HISTORY OF PRESENT ILLNES: Suzi Amaya is a 78 year old female with a history of GERD, peptic ulcer disease, paroxysmal atrial fibrillation on Eliquis, and recent foot surgery on 11/8/2019, who presents with symptomatic anemia and melena.  The patient had surgery on her left foot on 11/8/2019.  She resumed her Eliquis the following day.  She has had GERD for many years. She is currently on Protonix 40 mg daily.  If she takes her medication before dinner, her symptoms are relatively well controlled.  She uses ibuprofen minimally. She was found to have peptic ulcer disease on an upper endoscopy in 2014.  She was admitted to [facility] 11/12/2019 with a 3-day history of fatigue, and 1 day history of lightheadedness.  Yesterday, she passed a large amount of black, sick sticky stool, prompting her to seek evaluation.  She had a couple of loose, dark bowel movements today.  She states she was very lightheaded and fatigued yesterday, but this has improved today.  Hemoglobin on admission was 9.3.  Previous hemoglobin in May 2018 was 10.2.  Hemoglobin trended down from 8 yesterday afternoon to 6.2 at midnight. She received 1 unit of blood, and her hemoglobin is 7.5 currently.  Creatinine 1.02, BUN 38. INR 1.8.  She denies shortness of breath or lightheadedness.  She denies abdominal pain, nausea, vomiting, dysphagia, or odynophagia. Her vital signs are stable.  She was seen in consultation for reflux in 2014 with Dr. Luna.  At that time, she had recently increased her omeprazole to 40 mg with good control of her reflux symptoms. EGD 9/10/2014 showed a normal esophagus, 2 superficial,  benign-appearing 1 cm ulcers in the prepyloric antrum, and a normal duodenum.  Gastric biopsy showed reactive gastropathy with mucosal erosion/ulcer without chronic gastritis or H. pylori.  Repeat upper endoscopy 12/12/2014 showed a normal esophagus, 2 ulcers scars and mild erythema, but no erosions, and a normal duodenum.  Pathology pathology showed reactive gastropathy without chronic gastritis or H. Pylori.    Screening colonoscopy April 2010 showed diverticulosis in the transverse, descending, sigmoid colon, and internal hemorrhoids.     She does not smoke. She consumes 1-2 alcoholic beverages per week.    Family history is positive for peptic ulcer disease in her mother.   Past Medical History  Past Medical History:   Diagnosis Date     Arthritis     OA     Complication of anesthesia     nausea and vomitting from MS     Gastroesophageal reflux disease      Hyperlipidemia      Hypertension      Irregular heart beat     atrial fibrillation, PVCs     Left bundle branch block      Migraine      Nonischemic cardiomyopathy (H)      Sinus infection     recent, long-lasting, requiring prednisone and antibiotics     Uncomplicated asthma     increased with  allergies       Past Surgical History  Past Surgical History:   Procedure Laterality Date     ARTHROPLASTY REVISION KNEE Left 12/18/2017    Procedure: ARTHROPLASTY REVISION KNEE;  LEFT TOTAL KNEE POLYETHYLENE EXCHANGE   ;  Surgeon: Blu Mitchell MD;  Location:  OR     ARTHROPLASTY REVISION KNEE Left 5/29/2018    Procedure: ARTHROPLASTY REVISION KNEE;  Revision left total knee arthroplasty   ;  Surgeon: Fab Ennis MD;  Location: [facility] OR     ENT SURGERY      T&A     GYN SURGERY      D&C, tubal ligation     ORTHOPEDIC SURGERY  2005    right knee scope, RTK(eventually)     ORTHOPEDIC SURGERY  2005    left total knee       Family History  No family history on file.    Social History  Social History     Socioeconomic History     Marital status:       Spouse name: Not on file     Number of children: Not on file     Years of education: Not on file     Highest education level: Not on file   Occupational History     Not on file   Social Needs     Financial resource strain: Not on file     Food insecurity:     Worry: Not on file     Inability: Not on file     Transportation needs:     Medical: Not on file     Non-medical: Not on file   Tobacco Use     Smoking status: Never Smoker     Smokeless tobacco: Never Used   Substance and Sexual Activity     Alcohol use: Yes     Comment: 2 glasses of wine per week     Drug use: No     Sexual activity: Not on file   Lifestyle     Physical activity:     Days per week: Not on file     Minutes per session: Not on file     Stress: Not on file   Relationships     Social connections:     Talks on phone: Not on file     Gets together: Not on file     Attends Mosque service: Not on file     Active member of club or organization: Not on file     Attends meetings of clubs or organizations: Not on file     Relationship status: Not on file     Intimate partner violence:     Fear of current or ex partner: Not on file     Emotionally abused: Not on file     Physically abused: Not on file     Forced sexual activity: Not on file   Other Topics Concern     Parent/sibling w/ CABG, MI or angioplasty before 65F 55M? Not Asked   Social History Narrative     Not on file       Medications  Prior to Admission medications    Medication Sig Start Date End Date Taking? Authorizing Provider   acetaminophen (TYLENOL) 325 MG tablet Take 1 tablet (325 mg) by mouth every 4 hours as needed for mild pain or fever 12/19/17  Yes Yadi Cruz PA-C   albuterol (PROAIR HFA/PROVENTIL HFA/VENTOLIN HFA) 108 (90 BASE) MCG/ACT Inhaler Inhale 1-2 puffs into the lungs every 4 hours as needed for shortness of breath / dyspnea or wheezing   Yes Reported, Patient   Apixaban (ELIQUIS PO) Take 5 mg by mouth 2 times daily   Yes Reported, Patient  "  butalbital-aspirin-caffeine (FIORINAL) -40 MG per capsule Take 1 capsule by mouth 2 times daily as needed for headaches    Yes Reported, Patient   CARVEDILOL PO Take 12.5 mg by mouth 2 times daily   Yes Reported, Patient   Cyanocobalamin (B-12 PO) Take 1,000 mcg by mouth daily    Yes Reported, Patient   LOSARTAN POTASSIUM PO Take 100 mg by mouth At Bedtime   Yes Reported, Patient   multivitamin, therapeutic with minerals (THERA-VIT-M) TABS tablet Take 1 tablet by mouth daily   Yes Reported, Patient   ondansetron (ZOFRAN-ODT) 4 MG ODT tab Take 1 tablet (4 mg) by mouth every 6 hours as needed for nausea or vomiting 12/19/17  Yes Yadi Cruz PA-C   pantoprazole (PROTONIX) 40 MG EC tablet Take 40 mg by mouth daily   Yes Unknown, Entered By History   traMADol (ULTRAM) 50 MG tablet Take 1 tablet (50 mg) by mouth every 6 hours as needed for severe pain 5/30/18  Yes Jc Pugh PA-C   triamterene-hydrochlorothiazide (MAXZIDE-25) 37.5-25 MG per tablet Take 1 tablet by mouth every morning   Yes Reported, Patient   VITAMIN D, CHOLECALCIFEROL, PO Take 2,000 Units by mouth daily (with dinner)    Yes Reported, Patient   COMPRESSION STOCKINGS 1 each continuous 5/30/18   Jc Pugh PA-C       Allergies:  Dilaudid [hydromorphone]; Morphine; Simvastatin; and Xarelto [rivaroxaban]    ROS: A ten point review of systems was obtained and negative other than the symptoms noted above in the HPI.     PHYSICAL EXAM:   /79   Pulse 70   Temp 98.1  F (36.7  C) (Oral)   Resp 13   Ht 1.6 m (5' 3\")   Wt 66.7 kg (147 lb)   SpO2 98%   BMI 26.04 kg/m     Constitutional: healthy, alert, no acute distress  Cardiovascular: regular rate and rhythm  Respiratory: clear to auscultation bilaterally  Psychiatric: normal pleasant affect  Head: atraumatic, normocephalic  Neck: supple, no thyromegaly  ENT: mucous membranes are moist, no oral lesions are noted  Abdomen: soft, non-tender, non-distended, normally " active bowel sounds. No rebound tenderness or guarding  Neuro: Neurologically intact grossly  Skin: left lower let in boot     LABORATORY DATA  I reviewed the patient's new clinical lab test results.   Recent Labs   Lab Test 11/13/19  0529 11/12/19  2349 11/12/19  1902 11/12/19  1343 05/31/18  0641 05/30/18  0706   WBC  --   --   --  10.3  --   --    HGB 7.5* 6.2* 7.3* 8.0* 9.3* 10.2*   MCV  --   --   --  90  --   --    PLT  --   --   --  259  --   --    INR  --   --   --  1.80* 1.20* 1.29*      Recent Labs   Lab Test 11/13/19  0529 11/12/19  1343 05/31/18  0641    138  --    POTASSIUM 3.6 3.8  --    CHLORIDE 112* 102  --    CO2 26 27  --    BUN 27 38*  --    CR 0.80 1.02 0.70   ANIONGAP 3 9  --    JOSÉ MIGUEL 8.0* 8.3*  --       Recent Labs   Lab Test 11/12/19  1343   ALBUMIN 3.2*   BILITOTAL 0.5   ALT 17   AST 14   ALKPHOS 43      IMPRESSION: Suzi Amaya is a 78 year old female with a history of GERD, peptic ulcer disease, paroxysmal atrial fibrillation on Eliquis, and recent foot surgery on 11/8/2019, who presents with symptomatic anemia and melena.  Given her history of gastric ulcers in 2014, she most likely has peptic ulcer disease, aggravated by Eliquis.  NSAID use is minimal.  Other differentials that are considered include erosive esophagitis and AVM.     PLAN/RECOMMENDATIONS:   -Continue PPI BID  -Hold Eliquis   -Check INR   -NPO  -EGD today    I will discuss the patient's findings and plan with Dr. Cohn who will also independently see and examine the patient.   Thank you for involving me in this patient's care, please do not hesitate to call with questions or concerns.     Aracelis Ragland, CNP  [facility]   Cell: [phone] until 1PM. Call Dr. Cohn after 1PM.   After 5PM: please call [phone]

## 2019-11-13 NOTE — PROVIDER NOTIFICATION
Paged hospitalist to report Hgb of 6.2. Orders in place for transfusion for hgb less than 7. Signed Consent already in chart. VSS, no symptoms.

## 2019-11-14 VITALS
SYSTOLIC BLOOD PRESSURE: 132 MMHG | WEIGHT: 142.5 LBS | OXYGEN SATURATION: 98 % | TEMPERATURE: 97.8 F | HEART RATE: 60 BPM | RESPIRATION RATE: 18 BRPM | DIASTOLIC BLOOD PRESSURE: 62 MMHG | HEIGHT: 63 IN | BODY MASS INDEX: 25.25 KG/M2

## 2019-11-14 LAB
H PYLORI AG STL QL IA: POSITIVE
HCT VFR BLD AUTO: 22.6 % (ref 35–47)
HCT VFR BLD AUTO: 23.3 % (ref 35–47)
HGB BLD-MCNC: 7.4 G/DL (ref 11.7–15.7)
HGB BLD-MCNC: 7.4 G/DL (ref 11.7–15.7)

## 2019-11-14 PROCEDURE — 25000128 H RX IP 250 OP 636: Performed by: INTERNAL MEDICINE

## 2019-11-14 PROCEDURE — C9113 INJ PANTOPRAZOLE SODIUM, VIA: HCPCS | Performed by: INTERNAL MEDICINE

## 2019-11-14 PROCEDURE — 36415 COLL VENOUS BLD VENIPUNCTURE: CPT | Performed by: HOSPITALIST

## 2019-11-14 PROCEDURE — 85014 HEMATOCRIT: CPT | Performed by: HOSPITALIST

## 2019-11-14 PROCEDURE — 85018 HEMOGLOBIN: CPT | Performed by: HOSPITALIST

## 2019-11-14 PROCEDURE — 99239 HOSP IP/OBS DSCHRG MGMT >30: CPT | Performed by: HOSPITALIST

## 2019-11-14 RX ADMIN — PANTOPRAZOLE SODIUM 40 MG: 40 INJECTION, POWDER, FOR SOLUTION INTRAVENOUS at 05:55

## 2019-11-14 ASSESSMENT — MIFFLIN-ST. JEOR: SCORE: 1095.51

## 2019-11-14 ASSESSMENT — ACTIVITIES OF DAILY LIVING (ADL)
ADLS_ACUITY_SCORE: 11
ADLS_ACUITY_SCORE: 11
ADLS_ACUITY_SCORE: 10

## 2019-11-14 NOTE — PLAN OF CARE
DATE & TIME: 11/13/19 nishi             Cognitive Concerns/ Orientation : A/O#4  BEHAVIOR & AGGRESSION TOOL COLOR: green, calm/coop  ABNL VS/O2: VSS on RA  MOBILITY: SBA, Lt foot ortho boot on at all times  PAIN MANAGMENT: denies  DIET: Full, fair po  BOWEL/BLADDER: continent  ABNL LAB/BG:Hgb7.5/7.9/7.6 today, continue to check q6h  DRAIN/DEVICES: PIV SL  TELEMETRY RHYTHM: SR w/BBB  SKIN: dry//pale  TESTS/PROCEDURES: GI to eval in am  D/C DAY/GOALS/PLACE: possibly tomorrow pending stable Hgb  OTHER IMPORTANT INFO: Pt tx/x from CCU tonight, pleasant, SBA, denies dizziness/pain, tolerated full liq, GI following.

## 2019-11-14 NOTE — PROGRESS NOTES
Transfer    S- Transfer to [facility] from CCU    B- Hx of a-fib, s/p Rt foot surgery    A- Brief systems assessment:   R- Transfer to 66 per physician orders. Continue to monitor pt and update physician as needed.     Code status: Full  Skin: dry/incision  Fall Risk: yes  Isolation: no  Patient belongings:see Epic  Medication drips upon transfer:none  Bedside Report Letter Given and explained to pt: yes

## 2019-11-14 NOTE — PROGRESS NOTES
Discharge    Patient discharged to home via car with her .    Care plan note:  DATE & TIME: 11/13/19 nights          Cognitive Concerns/ Orientation : A/O x 4  BEHAVIOR & AGGRESSION TOOL COLOR: green, calm/coop  ABNL VS/O2: VSS on RA  MOBILITY: SBA, Lt foot ortho boot on at all times  PAIN MANAGMENT: denies  DIET: Full liquid  BOWEL/BLADDER: continent  ABNL LAB/BG: Hgb q6h:  7.4,   DRAIN/DEVICES: PIV SL  TELEMETRY RHYTHM: 1st degree AVB w/BBB  SKIN: dry, pale. Incision to L foot from prior surgery  TESTS/PROCEDURES: GI to eval today  D/C DAY/GOALS/PLACE: possibly today pending Hgb  OTHER IMPORTANT INFO:     Listed belongings gathered and returned to patient. Yes  Care Plan and Patient education resolved: Yes  Prescriptions if needed, hard copies sent with patient  Yes  Home and hospital acquired medications returned to patient: Yes  Medication Bin checked and emptied on discharge Yes  Follow up appointment made for patient: yes; EGD in 8 weeks

## 2019-11-14 NOTE — PLAN OF CARE
DATE & TIME: 11/13/19 nights          Cognitive Concerns/ Orientation : A/O x 4  BEHAVIOR & AGGRESSION TOOL COLOR: green, calm/coop  ABNL VS/O2: VSS on RA  MOBILITY: SBA, Lt foot ortho boot on at all times  PAIN MANAGMENT: denies  DIET: Full liquid  BOWEL/BLADDER: continent  ABNL LAB/BG: Hgb q6h:  7.4,   DRAIN/DEVICES: PIV SL  TELEMETRY RHYTHM: 1st degree AVB w/BBB  SKIN: dry, pale. Incision to L foot from prior surgery  TESTS/PROCEDURES: GI to eval today  D/C DAY/GOALS/PLACE: possibly today pending Hgb  OTHER IMPORTANT INFO:

## 2019-11-15 NOTE — DISCHARGE SUMMARY
[facility]  Hospitalist Discharge Summary       Date of Admission:  11/12/2019  Date of Discharge:  11/14/2019  8:03 AM  Discharging Provider: Eduar Diaz MD      Discharge Diagnoses     Acute blood loss anemia suspected due to intermittent bleeding from gastric antral ulcer    Non bleeding medium to large gastric antral ulcer    Anticoagulation for Afib, on Elquis    HTN    Paroxysmal Afib     LBBB     S/p foot surgery for claw toe of L foot    Follow-ups Needed After Discharge   Follow-up Appointments     Follow-up and recommended labs and tests       Follow up with primary care provider, Gilson Mccollum, within 7 days to   evaluate medication change and for hospital follow- up.  The following   labs/tests are recommended: H&H in 5-7 days.    Follow up with [facility] in 8 weeks for repeat EGD, please call for   appointment.               Unresulted Labs Ordered in the Past 30 Days of this Admission     No orders found from 10/13/2019 to 11/13/2019.      These results will be followed up by none    Discharge Disposition   Discharged to home  Condition at discharge: Stable    Hospital Course   Suzi Amaya is a 78 year old female who is on Eliquis for paroxysmal atrial fibrillation, GERD and underwent foot surgery on 11/12/2019, resuming Eliquis the day after surgery. She took one naproxen the day of presentation, but otherwise denies NSAID's or ETOH use. She has no history of PUD. EGD on 9/2014 showed nl esophagus, stomach and duodenum.      Hgb at presentation is 9.3 with nl MCV, last hgb 10.2 on 5/30/2018 and all hgb's prior were around 10. T 98.1, 86, 103/47, 16, 99% on RA.  BUN up at 38, Cr 1.02.      Acute blood loss anemia suspected due to upper GI bleeding from antral ulcer  Non bleeding medium to large gastric antral ulcer  Anticoagulation for Afib, on Elquis  Hb was 10.2 in 5/018.  Resented with 9.3.  Reported melena, light-headedness.  Suspected upper GI bleed.  No NSAIDs use.  On  "apixaban as above.  Received IV hydration in ER.  Hemodynamically stable.  [place] [facility]  PPI drip was restarted.  -Hb dropped to 6.2, received 1 unit PRBC, appropriate rise in Hb post transfusion. s/p EGD, medium to large gastric antral ulcer, non bleeding noted.  Medium hiatal hernia noted.  Stool H. pylori antigen ordered by GI and pending. Hb was stable post transfusion at 7. IV venofer infused x1. PPI infusion changed to IV BID and clear liquid diet after EGD per GI and discharged on Omeprazole 40 mg p.o. twice daily for 8 weeks then daily thereafter per GI.  Needs repeat EGD in 8 weeks to ensure ulcer is healing. Patient rakes fiorinal for headaches, it has ASA and so discontinued.     Per GI ok to resume Apixaban tomorrow, discussed with patient at great length about anticoagulation and risk and symptoms of bleeding and to come to ER if any signs or symptoms develop.     Hypertension   Mild KAILASH, prerenal secondary to above  - PTA losartan, triamterene/HCTZ, as well as coreg held on admision. Coreg resumed after EGD. Rest medications resumed at discharge.      Paroxysmal Afib, currently in NSR  LBBB   Anticoagulation for afib, on Apixaban as above, CHADSVASc 4.   - PTA coreg and Apixaban held on admission.  -Resumed Coreg after EGD. Resuming apixaban tomorrow after discharge.      S/p foot surgery for claw toe of L foot. - She was juts in to see Dr. Rubio and had dressing changed and boot replaced on day of admission in podiatry clinic.   - Podiatry consulted to follow wound. Per patient there was a lot of \"pink drainage\" but she ws informed there was no evidence of infection or active bleeding and there is no significant pain or fevers.    - Percocet caused nausea.  continue PTA tramadol and tylenol for pain.     Consultations This Hospital Stay   GASTROENTEROLOGY IP CONSULT    Code Status   Full Code    Time Spent on this Encounter   Eduar RONDON MD, personally saw the patient today and " spent greater than 30 minutes discharging this patient.       Eduar Diaz MD  [facility]  ______________________________________________________________________    Physical Exam   Vital Signs: Temp: 97.8  F (36.6  C) Temp src: Oral BP: 132/62   Heart Rate: 69 Resp: 18 SpO2: 98 % O2 Device: None (Room air)    Weight: 142 lbs 8 oz  General: AAOx3, appears comfortable.  HEENT: PERRLA EOMI. Mucosa moist.   Lungs: Bilateral equal air entry. Clear to auscultation, normal work of breathing.   CVS: S1S2 regular, no tachycardia or murmur.   Abdomen: Soft, NT, ND. BS heard.  MSK: No edema or deformities. Lt foot with walking boot.  Neuro: AAOX3. CN 2-12 normal. Strength symmetrical.  Skin: No rash.        Primary Care Physician   Gilson SANDERSON Post    Discharge Orders      Reason for your hospital stay    GI bleeding due to stomach ulcer.     Follow-up and recommended labs and tests     Follow up with primary care provider, Gilson Mccollum, within 7 days to evaluate medication change and for hospital follow- up.  The following labs/tests are recommended: H&H in 5-7 days.    Follow up with [facility] in 8 weeks for repeat EGD, please call for appointment.     Activity    Your activity upon discharge: activity as tolerated     Discharge Instructions    Resume taking Apixaban from 11/15/19.     Full Code     Diet    Follow this diet upon discharge: Orders Placed This Encounter  Regular diet       Significant Results and Procedures   Most Recent 3 BMP's:  Recent Labs   Lab Test 11/13/19  0529 11/12/19  1343 05/31/18  0641    138  --    POTASSIUM 3.6 3.8  --    CHLORIDE 112* 102  --    CO2 26 27  --    BUN 27 38*  --    CR 0.80 1.02 0.70   ANIONGAP 3 9  --    JOSÉ MIGUEL 8.0* 8.3*  --    * 122* 130*     Most Recent 2 LFT's:  Recent Labs   Lab Test 11/12/19  1343   AST 14   ALT 17   ALKPHOS 43   BILITOTAL 0.5     Most Recent 3 INR's:  Recent Labs   Lab Test 11/13/19  0914 11/12/19  1343 05/31/18  0641   INR 1.47*  1.80* 1.20*     Most Recent 3 Hemoglobins:  Recent Labs   Lab Test 11/14/19  0544 11/14/19  0013 11/13/19  1839   HGB 7.4* 7.4* 7.6*     Most Recent TSH and T4:No lab results found.,   Results for orders placed or performed during the hospital encounter of 05/29/18   XR Knee Port Left 1/2 Views    Narrative    KNEE ONE-TWO VIEWS LEFT  5/29/2018 3:01 PM     HISTORY: Post-Op Total Knee;     COMPARISON: Film dated 12/8/2005.    FINDINGS: Patient is status post a revision the left knee  arthroplasty. Longstem femoral and tibial components are in place..   The components appear to be in proper position. No postoperative  complications are identified.    OMID VAUGHAN MD       Discharge Medications   Discharge Medication List as of 11/14/2019  7:48 AM      START taking these medications    Details   omeprazole (PRILOSEC) 40 MG DR capsule Take 40 mg PO BID before meals for 8 weeks then 40 mg PO daily there after., Disp-60 capsule, R-3, E-Prescribe         CONTINUE these medications which have NOT CHANGED    Details   acetaminophen (TYLENOL) 325 MG tablet Take 1 tablet (325 mg) by mouth every 4 hours as needed for mild pain or fever, Disp-100 tablet, R-0, E-Prescribe      albuterol (PROAIR HFA/PROVENTIL HFA/VENTOLIN HFA) 108 (90 BASE) MCG/ACT Inhaler Inhale 1-2 puffs into the lungs every 4 hours as needed for shortness of breath / dyspnea or wheezing, Historical      Apixaban (ELIQUIS PO) Take 5 mg by mouth 2 times daily, Historical      CARVEDILOL PO Take 12.5 mg by mouth 2 times daily, Historical      COMPRESSION STOCKINGS 1 each continuous, Disp-1 each, R-0, No Print Out      Cyanocobalamin (B-12 PO) Take 1,000 mcg by mouth daily , Historical      LOSARTAN POTASSIUM PO Take 100 mg by mouth At Bedtime, Historical      multivitamin, therapeutic with minerals (THERA-VIT-M) TABS tablet Take 1 tablet by mouth daily, Historical      ondansetron (ZOFRAN-ODT) 4 MG ODT tab Take 1 tablet (4 mg) by mouth every 6 hours as needed for  nausea or vomiting, Disp-20 tablet, R-0, E-Prescribe      traMADol (ULTRAM) 50 MG tablet Take 1 tablet (50 mg) by mouth every 6 hours as needed for severe pain, Disp-60 tablet, R-0, Local Print      triamterene-hydrochlorothiazide (MAXZIDE-25) 37.5-25 MG per tablet Take 1 tablet by mouth every morning, Historical      VITAMIN D, CHOLECALCIFEROL, PO Take 2,000 Units by mouth daily (with dinner) , Historical         STOP taking these medications       butalbital-aspirin-caffeine (FIORINAL) -40 MG per capsule Comments:   Reason for Stopping:         pantoprazole (PROTONIX) 40 MG EC tablet Comments:   Reason for Stopping:             Allergies   Allergies   Allergen Reactions     Dilaudid [Hydromorphone] Nausea and Vomiting     Morphine Nausea and Vomiting     Simvastatin GI Disturbance     Xarelto [Rivaroxaban] Other (See Comments)     Profuse bleeding

## (undated) DEVICE — PACK TOTAL KNEE SOP15TKFSD

## (undated) DEVICE — SU VICRYL 2-0 CT-1 27" UND J259H

## (undated) DEVICE — BONE CEMENT MIXEVAC III HI VAC KIT  0206-015-000

## (undated) DEVICE — CAST PADDING 6" STERILE 9046S

## (undated) DEVICE — LINEN TOWEL PACK X5 5464

## (undated) DEVICE — SUCTION IRR SYSTEM W/O TIP INTERPULSE HANDPIECE 0210-100-000

## (undated) DEVICE — SU ETHIBOND 2 V-37 4X30" MX69G

## (undated) DEVICE — MANIFOLD NEPTUNE 4 PORT 700-20

## (undated) DEVICE — DRSG ADAPTIC 3X8" 6113

## (undated) DEVICE — SYR 30ML LL W/O NDL

## (undated) DEVICE — BONE CEMENT MIXING SYSTEM REVOLUTION

## (undated) DEVICE — CAST PADDING 6" UNSTERILE 9046

## (undated) DEVICE — TOURNIQUET CUFF 30" REPRO BLUE 60-7070-105

## (undated) DEVICE — SU VICRYL 1 CT-1 27" J341H

## (undated) DEVICE — DRSG AQUACEL AG 3.5X9.75" HYDROFIBER 412011

## (undated) DEVICE — GLOVE PROTEXIS BLUE W/NEU-THERA 6.5  2D73EB65

## (undated) DEVICE — LINEN FULL SHEET 5511

## (undated) DEVICE — NDL BLUNT 18GA 1" W/O FILTER 305181

## (undated) DEVICE — PACK TOTAL KNEE BOXED LATEX FREE PO15TKFCT

## (undated) DEVICE — NDL 19GA 1.5"

## (undated) DEVICE — SOL WATER IRRIG 1000ML BOTTLE 2F7114

## (undated) DEVICE — GLOVE PROTEXIS POWDER FREE 6.5 ORTHOPEDIC 2D73ET65

## (undated) DEVICE — SYR 05ML LL W/O NDL

## (undated) DEVICE — BONE CLEANING TIP INTERPULSE  0210-010-000

## (undated) DEVICE — GLOVE PROTEXIS POWDER FREE 8.0 ORTHOPEDIC 2D73ET80

## (undated) DEVICE — DRSG KERLIX FLUFFS X5

## (undated) DEVICE — BAG CLEAR TRASH 1.3M 39X33" P4040C

## (undated) DEVICE — BLADE SAW RECIP STRK LONG 70X12.5X0.9MM 0277-096-278

## (undated) DEVICE — DRAPE SHEET REV FOLD 3/4 9349

## (undated) DEVICE — PREP CHLORAPREP 26ML TINTED ORANGE  260815

## (undated) DEVICE — BNDG ELASTIC 4"X5YDS UNSTERILE 6611-40

## (undated) DEVICE — CATH FOLEY 16FR 5ML LUBRICATH LATEX 0165L16

## (undated) DEVICE — SPECIMEN CULTURETTE DBL SWAB 220109

## (undated) DEVICE — NDL SPINAL 18GA 3.5" 405184

## (undated) DEVICE — ESU GROUND PAD UNIVERSAL W/O CORD

## (undated) DEVICE — SYR 30ML LL W/O NDL 302832

## (undated) DEVICE — GLOVE PROTEXIS W/NEU-THERA 7.5  2D73TE75

## (undated) DEVICE — SOL NACL 0.9% IRRIG 1000ML BOTTLE 07138-09

## (undated) DEVICE — BLADE SAW SAGITTAL STRK 25X90X1.27MM HD SYS 6 6125-127-090

## (undated) DEVICE — DRSG ABDOMINAL 07 1/2X8" 7197D

## (undated) DEVICE — SUCTION MANIFOLD NEPTUNE 2 SYS 4 PORT 0702-020-000

## (undated) DEVICE — LINEN ORTHO ACL PACK 5447

## (undated) DEVICE — SU VICRYL 1 CT-1 27" UND J261H

## (undated) DEVICE — SET HANDPIECE INTERPULSE W/COAXIAL FAN SPRAY TIP 0210118000

## (undated) RX ORDER — FENTANYL CITRATE 50 UG/ML
INJECTION, SOLUTION INTRAMUSCULAR; INTRAVENOUS
Status: DISPENSED
Start: 2017-12-18

## (undated) RX ORDER — DEXAMETHASONE SODIUM PHOSPHATE 4 MG/ML
INJECTION, SOLUTION INTRA-ARTICULAR; INTRALESIONAL; INTRAMUSCULAR; INTRAVENOUS; SOFT TISSUE
Status: DISPENSED
Start: 2017-12-18

## (undated) RX ORDER — BUPIVACAINE HYDROCHLORIDE AND EPINEPHRINE 5; 5 MG/ML; UG/ML
INJECTION, SOLUTION EPIDURAL; INTRACAUDAL; PERINEURAL
Status: DISPENSED
Start: 2018-05-29

## (undated) RX ORDER — DEXAMETHASONE SODIUM PHOSPHATE 4 MG/ML
INJECTION, SOLUTION INTRA-ARTICULAR; INTRALESIONAL; INTRAMUSCULAR; INTRAVENOUS; SOFT TISSUE
Status: DISPENSED
Start: 2018-05-29

## (undated) RX ORDER — METHYLPREDNISOLONE ACETATE 80 MG/ML
INJECTION, SUSPENSION INTRA-ARTICULAR; INTRALESIONAL; INTRAMUSCULAR; SOFT TISSUE
Status: DISPENSED
Start: 2018-05-29

## (undated) RX ORDER — FENTANYL CITRATE 50 UG/ML
INJECTION, SOLUTION INTRAMUSCULAR; INTRAVENOUS
Status: DISPENSED
Start: 2018-05-29

## (undated) RX ORDER — KETOROLAC TROMETHAMINE 30 MG/ML
INJECTION, SOLUTION INTRAMUSCULAR; INTRAVENOUS
Status: DISPENSED
Start: 2017-12-18

## (undated) RX ORDER — FENTANYL CITRATE 50 UG/ML
INJECTION, SOLUTION INTRAMUSCULAR; INTRAVENOUS
Status: DISPENSED
Start: 2019-11-13

## (undated) RX ORDER — CEFAZOLIN SODIUM 2 G/100ML
INJECTION, SOLUTION INTRAVENOUS
Status: DISPENSED
Start: 2017-12-18

## (undated) RX ORDER — CEFAZOLIN SODIUM 2 G/100ML
INJECTION, SOLUTION INTRAVENOUS
Status: DISPENSED
Start: 2018-05-29

## (undated) RX ORDER — NEOSTIGMINE METHYLSULFATE 1 MG/ML
VIAL (ML) INJECTION
Status: DISPENSED
Start: 2018-05-29

## (undated) RX ORDER — KETOROLAC TROMETHAMINE 30 MG/ML
INJECTION, SOLUTION INTRAMUSCULAR; INTRAVENOUS
Status: DISPENSED
Start: 2018-05-29

## (undated) RX ORDER — DEXAMETHASONE SODIUM PHOSPHATE 10 MG/ML
INJECTION, SOLUTION INTRAMUSCULAR; INTRAVENOUS
Status: DISPENSED
Start: 2018-05-29

## (undated) RX ORDER — ONDANSETRON 2 MG/ML
INJECTION INTRAMUSCULAR; INTRAVENOUS
Status: DISPENSED
Start: 2018-05-29

## (undated) RX ORDER — BUPIVACAINE HYDROCHLORIDE AND EPINEPHRINE 5; 5 MG/ML; UG/ML
INJECTION, SOLUTION EPIDURAL; INTRACAUDAL; PERINEURAL
Status: DISPENSED
Start: 2017-12-18

## (undated) RX ORDER — GLYCOPYRROLATE 0.2 MG/ML
INJECTION INTRAMUSCULAR; INTRAVENOUS
Status: DISPENSED
Start: 2018-05-29

## (undated) RX ORDER — PROPOFOL 10 MG/ML
INJECTION, EMULSION INTRAVENOUS
Status: DISPENSED
Start: 2017-12-18

## (undated) RX ORDER — PROPOFOL 10 MG/ML
INJECTION, EMULSION INTRAVENOUS
Status: DISPENSED
Start: 2018-05-29

## (undated) RX ORDER — ONDANSETRON 2 MG/ML
INJECTION INTRAMUSCULAR; INTRAVENOUS
Status: DISPENSED
Start: 2019-11-13